# Patient Record
Sex: FEMALE | Race: WHITE | NOT HISPANIC OR LATINO | URBAN - METROPOLITAN AREA
[De-identification: names, ages, dates, MRNs, and addresses within clinical notes are randomized per-mention and may not be internally consistent; named-entity substitution may affect disease eponyms.]

---

## 2017-01-14 ENCOUNTER — INPATIENT (INPATIENT)
Facility: HOSPITAL | Age: 72
LOS: 3 days | Discharge: HOME HEALTH | DRG: 191 | End: 2017-01-18
Attending: INTERNAL MEDICINE | Admitting: INTERNAL MEDICINE
Payer: MEDICARE

## 2017-01-14 VITALS
SYSTOLIC BLOOD PRESSURE: 170 MMHG | HEART RATE: 103 BPM | TEMPERATURE: 101 F | OXYGEN SATURATION: 99 % | RESPIRATION RATE: 21 BRPM | DIASTOLIC BLOOD PRESSURE: 93 MMHG

## 2017-01-14 DIAGNOSIS — D64.9 ANEMIA, UNSPECIFIED: ICD-10-CM

## 2017-01-14 DIAGNOSIS — I25.10 ATHEROSCLEROTIC HEART DISEASE OF NATIVE CORONARY ARTERY WITHOUT ANGINA PECTORIS: ICD-10-CM

## 2017-01-14 DIAGNOSIS — J44.1 CHRONIC OBSTRUCTIVE PULMONARY DISEASE WITH (ACUTE) EXACERBATION: ICD-10-CM

## 2017-01-14 DIAGNOSIS — Z87.19 PERSONAL HISTORY OF OTHER DISEASES OF THE DIGESTIVE SYSTEM: Chronic | ICD-10-CM

## 2017-01-14 DIAGNOSIS — Z29.9 ENCOUNTER FOR PROPHYLACTIC MEASURES, UNSPECIFIED: ICD-10-CM

## 2017-01-14 DIAGNOSIS — N39.0 URINARY TRACT INFECTION, SITE NOT SPECIFIED: ICD-10-CM

## 2017-01-14 DIAGNOSIS — R63.8 OTHER SYMPTOMS AND SIGNS CONCERNING FOOD AND FLUID INTAKE: ICD-10-CM

## 2017-01-14 DIAGNOSIS — M19.90 UNSPECIFIED OSTEOARTHRITIS, UNSPECIFIED SITE: ICD-10-CM

## 2017-01-14 LAB
ANION GAP SERPL CALC-SCNC: 8 MMOL/L — LOW (ref 9–16)
APPEARANCE UR: CLEAR — SIGNIFICANT CHANGE UP
BACTERIA # UR AUTO: PRESENT /HPF
BASOPHILS NFR BLD AUTO: 0.3 % — SIGNIFICANT CHANGE UP (ref 0–2)
BILIRUB UR-MCNC: NEGATIVE — SIGNIFICANT CHANGE UP
BUN SERPL-MCNC: 31 MG/DL — HIGH (ref 7–23)
CALCIUM SERPL-MCNC: 9 MG/DL — SIGNIFICANT CHANGE UP (ref 8.5–10.5)
CHLORIDE SERPL-SCNC: 105 MMOL/L — SIGNIFICANT CHANGE UP (ref 96–108)
CK MB CFR SERPL CALC: 1.2 NG/ML — SIGNIFICANT CHANGE UP (ref 0.5–3.6)
CK SERPL-CCNC: 30 U/L — SIGNIFICANT CHANGE UP (ref 26–192)
CO2 SERPL-SCNC: 25 MMOL/L — SIGNIFICANT CHANGE UP (ref 22–31)
COLOR SPEC: YELLOW — SIGNIFICANT CHANGE UP
CREAT SERPL-MCNC: 1.6 MG/DL — HIGH (ref 0.5–1.3)
DIFF PNL FLD: NEGATIVE — SIGNIFICANT CHANGE UP
EOSINOPHIL NFR BLD AUTO: 1.9 % — SIGNIFICANT CHANGE UP (ref 0–6)
EPI CELLS # UR: SIGNIFICANT CHANGE UP /HPF
GLUCOSE SERPL-MCNC: 115 MG/DL — HIGH (ref 70–99)
GLUCOSE UR QL: NEGATIVE — SIGNIFICANT CHANGE UP
HCT VFR BLD CALC: 24.1 % — LOW (ref 34.5–45)
HGB BLD-MCNC: 7.8 G/DL — LOW (ref 11.5–15.5)
KETONES UR-MCNC: NEGATIVE — SIGNIFICANT CHANGE UP
LACTATE SERPL-SCNC: 0.8 MMOL/L — SIGNIFICANT CHANGE UP (ref 0.5–2)
LEUKOCYTE ESTERASE UR-ACNC: (no result)
LYMPHOCYTES # BLD AUTO: 13.6 % — SIGNIFICANT CHANGE UP (ref 13–44)
MAGNESIUM SERPL-MCNC: 2 MG/DL — SIGNIFICANT CHANGE UP (ref 1.6–2.4)
MCHC RBC-ENTMCNC: 27.7 PG — SIGNIFICANT CHANGE UP (ref 27–34)
MCHC RBC-ENTMCNC: 32.4 G/DL — SIGNIFICANT CHANGE UP (ref 32–36)
MCV RBC AUTO: 85.5 FL — SIGNIFICANT CHANGE UP (ref 80–100)
MONOCYTES NFR BLD AUTO: 5.4 % — SIGNIFICANT CHANGE UP (ref 2–14)
NEUTROPHILS NFR BLD AUTO: 78.8 % — HIGH (ref 43–77)
NITRITE UR-MCNC: NEGATIVE — SIGNIFICANT CHANGE UP
NT-PROBNP SERPL-SCNC: 2426 PG/ML — HIGH
PH UR: 5.5 — SIGNIFICANT CHANGE UP (ref 4–8)
PLATELET # BLD AUTO: 250 K/UL — SIGNIFICANT CHANGE UP (ref 150–400)
POTASSIUM SERPL-MCNC: 4.6 MMOL/L — SIGNIFICANT CHANGE UP (ref 3.5–5.3)
POTASSIUM SERPL-SCNC: 4.6 MMOL/L — SIGNIFICANT CHANGE UP (ref 3.5–5.3)
PROT UR-MCNC: (no result) MG/DL
RBC # BLD: 2.82 M/UL — LOW (ref 3.8–5.2)
RBC # FLD: 13.8 % — SIGNIFICANT CHANGE UP (ref 10.3–16.9)
SODIUM SERPL-SCNC: 138 MMOL/L — SIGNIFICANT CHANGE UP (ref 135–145)
SP GR SPEC: 1.02 — SIGNIFICANT CHANGE UP (ref 1–1.03)
TROPONIN I SERPL-MCNC: <0.015 NG/ML — SIGNIFICANT CHANGE UP (ref 0.01–0.04)
UROBILINOGEN FLD QL: 0.2 E.U./DL — SIGNIFICANT CHANGE UP
WBC # BLD: 9.3 K/UL — SIGNIFICANT CHANGE UP (ref 3.8–10.5)
WBC # FLD AUTO: 9.3 K/UL — SIGNIFICANT CHANGE UP (ref 3.8–10.5)
WBC UR QL: (no result) /HPF

## 2017-01-14 PROCEDURE — 93010 ELECTROCARDIOGRAM REPORT: CPT

## 2017-01-14 PROCEDURE — 99285 EMERGENCY DEPT VISIT HI MDM: CPT | Mod: 25

## 2017-01-14 PROCEDURE — 71010: CPT | Mod: 26

## 2017-01-14 RX ORDER — METOPROLOL TARTRATE 50 MG
25 TABLET ORAL DAILY
Qty: 0 | Refills: 0 | Status: DISCONTINUED | OUTPATIENT
Start: 2017-01-14 | End: 2017-01-18

## 2017-01-14 RX ORDER — IPRATROPIUM/ALBUTEROL SULFATE 18-103MCG
3 AEROSOL WITH ADAPTER (GRAM) INHALATION ONCE
Qty: 0 | Refills: 0 | Status: COMPLETED | OUTPATIENT
Start: 2017-01-14 | End: 2017-01-14

## 2017-01-14 RX ORDER — CIPROFLOXACIN LACTATE 400MG/40ML
250 VIAL (ML) INTRAVENOUS EVERY 24 HOURS
Qty: 0 | Refills: 0 | Status: COMPLETED | OUTPATIENT
Start: 2017-01-14 | End: 2017-01-16

## 2017-01-14 RX ORDER — MAGNESIUM SULFATE 500 MG/ML
2 VIAL (ML) INJECTION ONCE
Qty: 0 | Refills: 0 | Status: COMPLETED | OUTPATIENT
Start: 2017-01-14 | End: 2017-01-14

## 2017-01-14 RX ORDER — OXYCODONE HYDROCHLORIDE 5 MG/1
30 TABLET ORAL
Qty: 0 | Refills: 0 | Status: DISCONTINUED | OUTPATIENT
Start: 2017-01-14 | End: 2017-01-16

## 2017-01-14 RX ORDER — HEPARIN SODIUM 5000 [USP'U]/ML
5000 INJECTION INTRAVENOUS; SUBCUTANEOUS EVERY 8 HOURS
Qty: 0 | Refills: 0 | Status: DISCONTINUED | OUTPATIENT
Start: 2017-01-14 | End: 2017-01-18

## 2017-01-14 RX ORDER — INSULIN LISPRO 100/ML
VIAL (ML) SUBCUTANEOUS
Qty: 0 | Refills: 0 | Status: DISCONTINUED | OUTPATIENT
Start: 2017-01-14 | End: 2017-01-15

## 2017-01-14 RX ORDER — ASPIRIN/CALCIUM CARB/MAGNESIUM 324 MG
81 TABLET ORAL DAILY
Qty: 0 | Refills: 0 | Status: DISCONTINUED | OUTPATIENT
Start: 2017-01-14 | End: 2017-01-18

## 2017-01-14 RX ORDER — IPRATROPIUM/ALBUTEROL SULFATE 18-103MCG
3 AEROSOL WITH ADAPTER (GRAM) INHALATION EVERY 6 HOURS
Qty: 0 | Refills: 0 | Status: DISCONTINUED | OUTPATIENT
Start: 2017-01-14 | End: 2017-01-15

## 2017-01-14 RX ADMIN — Medication 250 MILLIGRAM(S): at 23:04

## 2017-01-14 RX ADMIN — Medication 50 GRAM(S): at 12:55

## 2017-01-14 RX ADMIN — Medication 3 MILLILITER(S): at 11:31

## 2017-01-14 RX ADMIN — OXYCODONE HYDROCHLORIDE 30 MILLIGRAM(S): 5 TABLET ORAL at 23:03

## 2017-01-14 RX ADMIN — OXYCODONE HYDROCHLORIDE 30 MILLIGRAM(S): 5 TABLET ORAL at 20:12

## 2017-01-14 RX ADMIN — Medication 60 MILLIGRAM(S): at 12:54

## 2017-01-14 RX ADMIN — OXYCODONE HYDROCHLORIDE 30 MILLIGRAM(S): 5 TABLET ORAL at 23:26

## 2017-01-14 NOTE — ED PROVIDER NOTE - OBJECTIVE STATEMENT
70 yo F hx of COPD s/p multiple recent admissions for dyspnea/COPD exacerbations presenting with 2 days of progressively worsening dyspnea, generalized activity intolerance, and new productive cough with green sputum.  No assocated fever.  Pt also reports midsternal chest pressure this morning resolved prior to arrival, described as restrictive breathing.  No LE edema or pain.

## 2017-01-14 NOTE — H&P ADULT. - PROBLEM SELECTOR PLAN 5
Patient reports history of heart attacks x 3. reports no cardiac catheterizations or stents.  - Cont ASA

## 2017-01-14 NOTE — H&P ADULT. - PROBLEM SELECTOR PLAN 1
Patient with history of multiple admissions and previous intubations. Patient not on home O2. Currently with dyspnea on exertion and mild wheezing on exam concerning for COPD exacerbation. Vs CHF exacerbation, patient with unknown hx, increased bnp, no congestion on cxr. No consolidation on CXR so low suspicion for PNA. No elevation in trops.   - Duonebs q6h   - Prednisone 40 PO daily   - Echo ordered  - Pulm consult in AM ([place])

## 2017-01-14 NOTE — ED PROVIDER NOTE - MEDICAL DECISION MAKING DETAILS
Pt with s.s noted above likely related to COPD exacerbation.  GOMES noted, given tx still feels sob.  Guaiac neg.  Unlikely symptoms related to hb although noted change from last year.  Plan admit COPD exacerbation tx, anemia gomes and likely needs placement.

## 2017-01-14 NOTE — H&P ADULT. - PROBLEM SELECTOR PLAN 7
DASH diet. Replete lytes prn DASH diet. Replete paramjit prn    Dispo: Admit to [facility] consult- patient reports being admitted to 3 rehab center this year and leaves

## 2017-01-14 NOTE — ED PROVIDER NOTE - RESPIRATORY, MLM
Comfortable breathing at times partial sentences, no acc m use, decreased aeration bl, no abn lung sounds.

## 2017-01-14 NOTE — H&P ADULT. - PROBLEM SELECTOR PLAN 4
Patient w/ history of chronic pain. Patient reports working w/ pain management specialists.   - Cont home med Oxycodone 30mg q3h PRN  - Pain management consult in AM

## 2017-01-14 NOTE — H&P ADULT. - ASSESSMENT
71 y.o F PMH of COPD not on home O2 w/ multiple hospitalizations and previous intubations, presenting to the ED for SOB likely 2/2 COPD exacerbation.

## 2017-01-14 NOTE — ED PROVIDER NOTE - MUSCULOSKELETAL, MLM
No calf ttp or LE edema.  Symmetric UE/LE pulses.  Spine appears normal, range of motion is not limited, no muscle or joint tenderness

## 2017-01-14 NOTE — H&P ADULT. - PROBLEM SELECTOR PLAN 2
Patient with Hb 7.8, decrease from 10.0 in Nov. Patient denies bleeding. Stool Guaiac in ED neg.  - F/u iron studies  - Monitor CBC

## 2017-01-14 NOTE — H&P ADULT. - HISTORY OF PRESENT ILLNESS
71 y.o F PMH of COPD not on home O2 w/ previous intubations and multiple hospital admissions, sciatica w/ chronic pain and wheelchair bound 2/2 LE injury presenting to the ED c.o SOB. Patient reports feeling short of breath this morning when waking up and sitting herself up from bed. Denies any fever, chill n/v/d. Denies any recent illness or sick contacts. Patient was recently discharged from [facility] 2 days ago after a 10 day hospital admission. 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 Patient reports increased urinary frequency, denies dysuria.    HPI limited 2/2 to patient mental status as patient with circumstantial speech with difficulty in redirection.      Vitals in the ED: Tm 100.7 --> 97.8 without intervention -91 BP: 159/84 RR 20 Sat 100% on RA    In the ED patient received: Levaquin 500mg IV, Mg Sulfate 2g IV, Prednisone 60 PO x1, Duoneb x1

## 2017-01-15 DIAGNOSIS — R06.00 DYSPNEA, UNSPECIFIED: ICD-10-CM

## 2017-01-15 DIAGNOSIS — R09.02 HYPOXEMIA: ICD-10-CM

## 2017-01-15 DIAGNOSIS — N17.9 ACUTE KIDNEY FAILURE, UNSPECIFIED: ICD-10-CM

## 2017-01-15 DIAGNOSIS — J44.9 CHRONIC OBSTRUCTIVE PULMONARY DISEASE, UNSPECIFIED: ICD-10-CM

## 2017-01-15 DIAGNOSIS — D64.9 ANEMIA, UNSPECIFIED: ICD-10-CM

## 2017-01-15 DIAGNOSIS — R63.4 ABNORMAL WEIGHT LOSS: ICD-10-CM

## 2017-01-15 DIAGNOSIS — J98.11 ATELECTASIS: ICD-10-CM

## 2017-01-15 DIAGNOSIS — G47.9 SLEEP DISORDER, UNSPECIFIED: ICD-10-CM

## 2017-01-15 DIAGNOSIS — I25.119 ATHEROSCLEROTIC HEART DISEASE OF NATIVE CORONARY ARTERY WITH UNSPECIFIED ANGINA PECTORIS: ICD-10-CM

## 2017-01-15 LAB
ANION GAP SERPL CALC-SCNC: 11 MMOL/L — SIGNIFICANT CHANGE UP (ref 9–16)
BLD GP AB SCN SERPL QL: NEGATIVE — SIGNIFICANT CHANGE UP
BUN SERPL-MCNC: 4 MG/DL — LOW (ref 7–23)
CALCIUM SERPL-MCNC: 7.9 MG/DL — LOW (ref 8.5–10.5)
CHLORIDE SERPL-SCNC: 110 MMOL/L — HIGH (ref 96–108)
CO2 SERPL-SCNC: 20 MMOL/L — LOW (ref 22–31)
CREAT SERPL-MCNC: 0.41 MG/DL — LOW (ref 0.5–1.3)
CULTURE RESULTS: NO GROWTH — SIGNIFICANT CHANGE UP
FERRITIN SERPL-MCNC: 78.7 NG/ML — SIGNIFICANT CHANGE UP (ref 8–252)
GLUCOSE SERPL-MCNC: 83 MG/DL — SIGNIFICANT CHANGE UP (ref 70–99)
HCT VFR BLD CALC: 35.3 % — SIGNIFICANT CHANGE UP (ref 34.5–45)
HGB BLD-MCNC: 11.8 G/DL — SIGNIFICANT CHANGE UP (ref 11.5–15.5)
IRON SATN MFR SERPL: 146 UG/DL — SIGNIFICANT CHANGE UP (ref 50–170)
IRON SATN MFR SERPL: 45 % — HIGH (ref 20–38)
MAGNESIUM SERPL-MCNC: 1.9 MG/DL — SIGNIFICANT CHANGE UP (ref 1.6–2.4)
MCHC RBC-ENTMCNC: 29.7 PG — SIGNIFICANT CHANGE UP (ref 27–34)
MCHC RBC-ENTMCNC: 33.4 G/DL — SIGNIFICANT CHANGE UP (ref 32–36)
MCV RBC AUTO: 88.9 FL — SIGNIFICANT CHANGE UP (ref 80–100)
PLATELET # BLD AUTO: 220 K/UL — SIGNIFICANT CHANGE UP (ref 150–400)
POTASSIUM SERPL-MCNC: 4 MMOL/L — SIGNIFICANT CHANGE UP (ref 3.5–5.3)
POTASSIUM SERPL-SCNC: 4 MMOL/L — SIGNIFICANT CHANGE UP (ref 3.5–5.3)
RBC # BLD: 3.97 M/UL — SIGNIFICANT CHANGE UP (ref 3.8–5.2)
RBC # BLD: 3.97 M/UL — SIGNIFICANT CHANGE UP (ref 3.8–5.2)
RBC # FLD: 12.5 % — SIGNIFICANT CHANGE UP (ref 10.3–16.9)
RETICS/RBC NFR: 1.6 % — SIGNIFICANT CHANGE UP (ref 0.5–2.5)
RH IG SCN BLD-IMP: POSITIVE — SIGNIFICANT CHANGE UP
SODIUM SERPL-SCNC: 141 MMOL/L — SIGNIFICANT CHANGE UP (ref 135–145)
SPECIMEN SOURCE: SIGNIFICANT CHANGE UP
TIBC SERPL-MCNC: 326 UG/DL — SIGNIFICANT CHANGE UP (ref 250–450)
TRANSFERRIN SERPL-MCNC: 222 MG/DL — SIGNIFICANT CHANGE UP (ref 200–360)
WBC # BLD: 5 K/UL — SIGNIFICANT CHANGE UP (ref 3.8–10.5)
WBC # FLD AUTO: 5 K/UL — SIGNIFICANT CHANGE UP (ref 3.8–10.5)

## 2017-01-15 RX ORDER — ACETAMINOPHEN 500 MG
650 TABLET ORAL EVERY 6 HOURS
Qty: 0 | Refills: 0 | Status: DISCONTINUED | OUTPATIENT
Start: 2017-01-15 | End: 2017-01-18

## 2017-01-15 RX ORDER — ALBUTEROL 90 UG/1
2.5 AEROSOL, METERED ORAL
Qty: 0 | Refills: 0 | Status: DISCONTINUED | OUTPATIENT
Start: 2017-01-15 | End: 2017-01-16

## 2017-01-15 RX ORDER — IPRATROPIUM BROMIDE 0.2 MG/ML
1 SOLUTION, NON-ORAL INHALATION EVERY 6 HOURS
Qty: 0 | Refills: 0 | Status: DISCONTINUED | OUTPATIENT
Start: 2017-01-15 | End: 2017-01-16

## 2017-01-15 RX ORDER — INSULIN LISPRO 100/ML
VIAL (ML) SUBCUTANEOUS
Qty: 0 | Refills: 0 | Status: DISCONTINUED | OUTPATIENT
Start: 2017-01-15 | End: 2017-01-18

## 2017-01-15 RX ADMIN — Medication 1 TABLET(S): at 12:42

## 2017-01-15 RX ADMIN — Medication 25 MILLIGRAM(S): at 06:18

## 2017-01-15 RX ADMIN — Medication 1 PUFF(S): at 07:44

## 2017-01-15 RX ADMIN — OXYCODONE HYDROCHLORIDE 30 MILLIGRAM(S): 5 TABLET ORAL at 06:19

## 2017-01-15 RX ADMIN — OXYCODONE HYDROCHLORIDE 30 MILLIGRAM(S): 5 TABLET ORAL at 04:00

## 2017-01-15 RX ADMIN — Medication 81 MILLIGRAM(S): at 12:43

## 2017-01-15 RX ADMIN — Medication 1 TABLET(S): at 14:30

## 2017-01-15 RX ADMIN — OXYCODONE HYDROCHLORIDE 30 MILLIGRAM(S): 5 TABLET ORAL at 03:07

## 2017-01-15 RX ADMIN — Medication 650 MILLIGRAM(S): at 06:57

## 2017-01-15 RX ADMIN — OXYCODONE HYDROCHLORIDE 30 MILLIGRAM(S): 5 TABLET ORAL at 18:30

## 2017-01-15 RX ADMIN — OXYCODONE HYDROCHLORIDE 30 MILLIGRAM(S): 5 TABLET ORAL at 07:05

## 2017-01-15 RX ADMIN — Medication 1 TABLET(S): at 02:11

## 2017-01-15 RX ADMIN — Medication 650 MILLIGRAM(S): at 20:00

## 2017-01-15 RX ADMIN — OXYCODONE HYDROCHLORIDE 30 MILLIGRAM(S): 5 TABLET ORAL at 13:00

## 2017-01-15 RX ADMIN — Medication 650 MILLIGRAM(S): at 19:32

## 2017-01-15 RX ADMIN — OXYCODONE HYDROCHLORIDE 30 MILLIGRAM(S): 5 TABLET ORAL at 17:53

## 2017-01-15 RX ADMIN — OXYCODONE HYDROCHLORIDE 30 MILLIGRAM(S): 5 TABLET ORAL at 12:41

## 2017-01-15 RX ADMIN — Medication 40 MILLIGRAM(S): at 12:42

## 2017-01-15 RX ADMIN — Medication 1 PUFF(S): at 17:53

## 2017-01-15 RX ADMIN — Medication 650 MILLIGRAM(S): at 07:05

## 2017-01-15 RX ADMIN — Medication 1 TABLET(S): at 13:52

## 2017-01-15 RX ADMIN — Medication 1 TABLET(S): at 02:15

## 2017-01-15 NOTE — DIETITIAN INITIAL EVALUATION ADULT. - PROBLEM SELECTOR PLAN 7
DASH diet. Replete paramjit prn    Dispo: Admit to [facility] consult- patient reports being admitted to 3 rehab center this year and leaves

## 2017-01-15 NOTE — PROGRESS NOTE ADULT - ASSESSMENT
71F PMH COPD (not on home O2) c/b multiple hospitalizations for exacerbations/intubations, chronic pain, MI, admitted 1/14 for SOB x 1 day likely 2/2 COPD exacerbation.

## 2017-01-15 NOTE — DIETITIAN INITIAL EVALUATION ADULT. - PHYSICAL APPEARANCE
RD unable to determine pt physical appearance as pt was entirely covered with blankets however RN reports pt is very thin.

## 2017-01-15 NOTE — DIETITIAN INITIAL EVALUATION ADULT. - ENERGY NEEDS
Height: 5 feet 3 inches, Weight (Current): 82 pounds,  pounds +/-10%, %IBW 71%, BMI 14.5    IBW used to calculate energy needs due to pt's current body weight is less than % of IBW   EER based on low BMI.

## 2017-01-15 NOTE — PROGRESS NOTE ADULT - PROBLEM SELECTOR PLAN 6
Hb 7.8 on admission, decreased from 10.0 in Nov. Patient denies bleeding. Stool Guaiac in ED neg. Hgb normalized to 11.8 today.

## 2017-01-15 NOTE — DIETITIAN INITIAL EVALUATION ADULT. - PROBLEM SELECTOR PLAN 3
Patient reports increased urinary frequency. UA with few wbc, present bacteria.   -  Cipro 250 q12 for 3 days

## 2017-01-15 NOTE — PROGRESS NOTE ADULT - PROBLEM SELECTOR PLAN 4
OLEGARIO on admission with Cr elevated 1.6, perhaps 2/2 UTI. Cr now 0.41 since starting abx.  - Trend

## 2017-01-15 NOTE — PROGRESS NOTE ADULT - PROBLEM SELECTOR PLAN 2
Patient reports increased urinary frequency. UA with few wbc, present bacteria. UCX NG, BCX NGTD x2  - c/w Cipro 250 q12 for 3 days (day 2/3)  - f/u final BCX

## 2017-01-15 NOTE — PROGRESS NOTE ADULT - PROBLEM SELECTOR PLAN 1
Patient with history of COPD (not on home O2) c/b multiple admissions for exacerbations/intubations. Admitted for SOB x 1 day likely 2/2 COPD exacerbation vs. less likely CHF exacerbation (given elevated BNP on admission, however elevated BNP could be related to COPD and OLEGARIO on admission). Unclear trigger for exacerbation - AF, no leukocytosis, no infiltrate on CXR  - c/w Prednisone 40mg PO QD, ISS given steroids  - c/w Atrovent 1 puff q6h (pt refuses Duoneb)  - Dr. Keller consulted today, f/u recs  - f/u TTE Patient with history of COPD (not on home O2) c/b multiple admissions for exacerbations/intubations. Admitted for SOB x 1 day likely 2/2 COPD exacerbation vs. less likely CHF exacerbation (given elevated BNP on admission, however elevated BNP could be related to COPD and OLEGARIO on admission). Unclear trigger for exacerbation - AF, no leukocytosis, no infiltrate on CXR  - c/w Prednisone 40mg PO QD  - c/w Atrovent 1 puff q6h (pt refuses Duoneb)  - Dr. Keller consulted today, f/u recs  - f/u TTE Patient with history of COPD (not on home O2) c/b multiple admissions for exacerbations/intubations. Admitted for SOB x 1 day likely 2/2 COPD exacerbation vs. less likely CHF exacerbation (given elevated BNP on admission, however elevated BNP could be related to COPD and OLEGARIO on admission). Unclear trigger for exacerbation - AF, no leukocytosis, no infiltrate on CXR  - c/w Prednisone 40mg PO QD  - c/w ISS given steroids  - c/w Atrovent 1 puff q6h (pt refuses Duoneb)  - Dr. Keller consulted today, f/u recs  - f/u TTE

## 2017-01-15 NOTE — PROGRESS NOTE ADULT - SUBJECTIVE AND OBJECTIVE BOX
Overnight Events: Pt complained of migraine, night team started Tylenol and Fioricet. Pt also complained of L axillary/chest pain (which pt has a chronic history of), likely MSK as reproducible on exam and repeat EKG unchanged from admission EKG.    [OBJECTIVE]:    Vital Signs:  T(F): , Max: 100.7 ( @ 10:45)  HR:  (90 - 105)  BP:  (132/77 - 173/96)  BP(mean): --  RR:  (16 - 21)  SpO2:  (95% - 100%)  Wt(kg): --  CVP(cm H2O): --      I & Os for current day (as of 01-15 @ 10:31)  =============================================  IN: 0 ml / OUT: 500 ml / NET: -500 ml    CAPILLARY BLOOD GLUCOSE      Physical Exam:  T(F): 96.8  HR: 90  BP: 132/77  RR: 16  SpO2: 100%  Wt(kg): --    General: NAD, circumstantial, unredirectable  HEENT: no JVD  Respiratory: diminished breath sounds b/l, scattered wheezes  Cardiovascular: RRR, nrml s1/s2, no MRG  Abdomen: Soft, NTND, normoactive BS  Extremities: no edema    Medications:  MEDICATIONS  (STANDING):  aspirin enteric coated 81milliGRAM(s) Oral daily  metoprolol succinate ER 25milliGRAM(s) Oral daily  insulin lispro (HumaLOG) corrective regimen sliding scale  SubCutaneous Before meals and at bedtime  heparin  Injectable 5000Unit(s) SubCutaneous every 8 hours  ciprofloxacin     Tablet 250milliGRAM(s) Oral every 24 hours  ipratropium 17 MICROgram(s) HFA Inhaler 1Puff(s) Inhalation every 6 hours  predniSONE   Tablet 40milliGRAM(s) Oral daily    MEDICATIONS  (PRN):  oxyCODONE IR 30milliGRAM(s) Oral every 3 hours PRN Severe Pain (7 - 10)  acetaminophen/butalbital/caffeine 1Tablet(s) Oral two times a day PRN headache  acetaminophen   Tablet. 650milliGRAM(s) Oral every 6 hours PRN Moderate Pain (4 - 6)      Allergies:  Allergies    apple (Unknown)  penicillin (Unknown)    Intolerances        Labs:                        11.8   5.0   )-----------( 220      ( 15 Natan 2017 08:37 )             35.3     15 Natan 2017 08:37    141    |  110    |  4      ----------------------------<  83     4.0     |  20     |  0.41     Ca    7.9        15 Natan 2017 08:37  Mg     1.9       15 Natan 2017 08:37        Urinalysis Basic - ( 2017 16:03 )    Color: Yellow / Appearance: Clear / S.020 / pH: x  Gluc: x / Ketone: NEGATIVE  / Bili: NEGATIVE / Urobili: 0.2 E.U./dL   Blood: x / Protein: Trace mg/dL / Nitrite: NEGATIVE   Leuk Esterase: Small / RBC: x / WBC Many /HPF   Sq Epi: x / Non Sq Epi: Few /HPF / Bacteria: Present /HPF

## 2017-01-15 NOTE — PROGRESS NOTE ADULT - PROBLEM SELECTOR PLAN 5
f/u. She believes that it is from [facility] f/u. She believes that it is from [facility]. But increase is very unusual - artefact?

## 2017-01-15 NOTE — PROGRESS NOTE ADULT - ASSESSMENT
elderly female with multiple somatic complaints  COPD  contracted, cachexic, kyphotic    Pulmonary consult-Dr. Neves  Cardiology  social service  supportive care

## 2017-01-15 NOTE — PROGRESS NOTE ADULT - PROBLEM SELECTOR PLAN 3
Patient w/ history of chronic pain. Patient reports working w/ pain management specialists.   - c/w home med Oxycodone 30mg q3h PRN severe pain  - c/w Tylenol 650mg q6h PRN moderate pain and Fioricet BID PRN HA x 2 days (total daily Tylenol dose of 3.25g --> NO ADDITIONAL TYLENOL)  - Pain management consult on Monday since [facility] office closed over wknd ([phone])

## 2017-01-15 NOTE — PROGRESS NOTE ADULT - SUBJECTIVE AND OBJECTIVE BOX
PUD [facility] ATTENDING    71 year old  women was admitted for dyspnea, cough, weight loss, chest pain, chest pressure and tightness.    PAST MEDICAL & SURGICAL HISTORY:  CAD (coronary artery disease)  Arthritis  COPD (chronic obstructive pulmonary disease)  H/O appendicitis  FMF    FAMILY HISTORY:  fa 70s  mo 85? colon cancer    SOCIAL HISTORY:  lives at home. Had a visiting RN.   retired     REVIEW OF SYSTEMS:  Constitutional: +weight change, +weight loss, -fever, -chills, +fatigue, -night sweats  Eyes: +cataracts -discharge, -eye pain, -visual change, -discharge  ENT:  -hearing difficulty, -tinnitus, -vertigo, -sinus pain, -throat pain, -epistaxis, -dysphagia, -hoarseness  Neck: -pain, -stiffness, -neck swelling  Respiratory: -cough (denied), +wheezing, -hemoptysis      Cardiovascular: +left chest pain, -dysrhythmia, +palpitations, +dizziness   Gastrointestinal: + left upper abdominal pain, -nausea, -vomiting, -hematemesis, -diarrhea, -constipation. -melena  Genitourinary: +dysuria with urine "pressure", -frequency, -hematuria, -incontinence      Neurologic: ++headache, +migraines, -memory loss, +loss of strength, -numbness, -tremor     Skin: -itching, -burning, -rash, -lesions   Lymphatic: -enlarged lymph nodes  Endocrine: -hair loss, -temperature intolerance         Musculoskeletal: +back pain (sciatic, scoliosis), -joint pain, -extremity pain  Psychiatric: -visual change, -auditory change, -depression, -anxiety, -suicidal  Sleep: +disorder ("I do not sleep well"), -insomnia, -sleep deprivation  Heme/Lymph: -easy bruising, -bleeding gums            Allergy and Immunologic: -hives, -eczema    Vital Signs Last 24 Hrs  T(C): 36, Max: 36.8 ( @ 22:58)  T(F): 96.8, Max: 98.2 ( @ 22:58)  HR: 90 (90 - 105)  BP: 132/77 (132/77 - 173/96)  BP(mean): --  RR: 16 (16 - 20)  SpO2: 100% (95% - 100%)    I&O's Detail    PHYSICAL EXAM:  +CP +SOB  In bed, cachectic (about 30 pounds loss?)  Muscle atrophy, well developed, comfortable, - acute distress; vital signs are monitored  Eyes: PERRLA, EOMI, -conjunctivitis, -scleritis   Head: no focal deficit, normocephalic,  no trauma  ENMT: moist tongue, no thrush, -nasal discharge, -hoarseness, normal hearing, -cough, -hemoptysis, trachea midline  Neck: supple, - lymphadenopathy,  -masses, -JVD  Respiratory: moderately to severely bilateral diminished breath sounds, -wheezing, -rhonchi, -rales, -crackles  Chest: -accessory muscle use, -paradoxical breathing  Cardiovascular: regular rate and sinus rhythm, S1 S2 normal, -S3, -S4, -murmur, -gallop, -rub  Gastrointestinal: soft, nontender, nondistended, normal bowel sounds, no hepatosplenomegaly  Genitourinary: -flank pain, +dysuria  Extremities: +clubbing, -cyanosis, -edema    Vascular: peripheral pulses palpable 2+ bilaterally  Neurological: alert, oriented x 3, no focal deficit, -tremor   Skin: warm, dry, -erythema, iv site intact  Lymph nodes; no cervical, supraclavicular or axillary adenopathy  Psychiatric: cooperative, appropriate mood    MEDICATIONS  (STANDING):  aspirin enteric coated 81milliGRAM(s) Oral daily  metoprolol succinate ER 25milliGRAM(s) Oral daily  heparin  Injectable 5000Unit(s) SubCutaneous every 8 hours  ciprofloxacin     Tablet 250milliGRAM(s) Oral every 24 hours  ipratropium 17 MICROgram(s) HFA Inhaler 1Puff(s) Inhalation every 6 hours  predniSONE   Tablet 40milliGRAM(s) Oral daily  multivitamin 1Tablet(s) Oral daily  ALBUTerol   0.5% 2.5milliGRAM(s) Nebulizer four times a day    MEDICATIONS  (PRN):  oxyCODONE IR 30milliGRAM(s) Oral every 3 hours PRN Severe Pain (7 - 10)  acetaminophen/butalbital/caffeine 1Tablet(s) Oral two times a day PRN headache  acetaminophen   Tablet. 650milliGRAM(s) Oral every 6 hours PRN Moderate Pain (4 - 6)    Allergies  apple (Unknown)  penicillin (Unknown)  Intolerances    LABS:                        11.8   5.0   )-----------( 220      ( 15 Natan 2017 08:37 )             35.3     15 Natan 2017 08:37    141    |  110    |  4      ----------------------------<  83     4.0     |  20     |  0.41     Ca    7.9        15 Natan 2017 08:37  Mg     1.9       15 Natan 2017 08:37    Urinalysis Basic - ( 2017 16:03 )  Color: Yellow / Appearance: Clear / S.020 / pH: x  Gluc: x / Ketone: NEGATIVE  / Bili: NEGATIVE / Urobili: 0.2 E.U./dL   Blood: x / Protein: Trace mg/dL / Nitrite: NEGATIVE   Leuk Esterase: Small / RBC: x / WBC Many /HPF   Sq Epi: x / Non Sq Epi: Few /HPF / Bacteria: Present /HPF    +DVT prophylaxis  -Sleep  +Nutrition goals, oral  -Pain  -Decubital ulcer  -GI prophylaxis (PPV, coagulopathy, Hx)  +Aspiration prophylaxis (45 degrees)    +Sedation/analgesia stopped  +ID (phos, CH, mupi, SB)  -Delirium    +Cardiac ASA/statin  +Prevention  vaccinations  +Education  quit smoking 30 years ago  +Medication reviewed (drug-drug interactions, PDA)  Medical devices    Discussed with PGY, RN    RADIOLOGY & ADDITIONAL STUDIES:    CXR reviewed (pa,lat): hyperinflation, possible atelectasis PUD [facility] [place]    71 year old  women was admitted for dyspnea, cough, weight loss, chest pain, chest pressure and tightness.    PAST MEDICAL & SURGICAL HISTORY:  CAD (coronary artery disease)  Arthritis  COPD (chronic obstructive pulmonary disease)  H/O appendicitis  FMF    FAMILY HISTORY:  fa 70s  mo 85? colon cancer    SOCIAL HISTORY:  lives at home. Had a visiting RN. Used to smoke  retired     REVIEW OF SYSTEMS:  Constitutional: +weight change, +weight loss, -fever, -chills, +fatigue, -night sweats  Eyes: +cataracts -discharge, -eye pain, -visual change, -discharge  ENT:  -hearing difficulty, -tinnitus, -vertigo, -sinus pain, -throat pain, -epistaxis, -dysphagia, -hoarseness  Neck: -pain, -stiffness, -neck swelling  Respiratory: -cough (denied), +wheezing, -hemoptysis      Cardiovascular: +left chest pain, -dysrhythmia, +palpitations, +dizziness   Gastrointestinal: + left upper abdominal pain, -nausea, -vomiting, -hematemesis, -diarrhea, -constipation. -melena  Genitourinary: +dysuria with urine "pressure", -frequency, -hematuria, -incontinence      Neurologic: ++headache, +migraines, -memory loss, +loss of strength, -numbness, -tremor     Skin: -itching, -burning, -rash, -lesions   Lymphatic: -enlarged lymph nodes  Endocrine: -hair loss, -temperature intolerance         Musculoskeletal: +back pain (sciatic, scoliosis), -joint pain, -extremity pain  Psychiatric: -visual change, -auditory change, -depression, -anxiety, -suicidal  Sleep: +disorder ("I do not sleep well"), -insomnia, -sleep deprivation  Heme/Lymph: -easy bruising, -bleeding gums            Allergy and Immunologic: -hives, -eczema    Vital Signs Last 24 Hrs  T(C): 36, Max: 36.8 ( @ 22:58)  T(F): 96.8, Max: 98.2 ( @ 22:58)  HR: 90 (90 - 105)  BP: 132/77 (132/77 - 173/96)  BP(mean): --  RR: 16 (16 - 20)  SpO2: 100% (95% - 100%)    I&O's Detail    PHYSICAL EXAM:  +CP +SOB  In bed, cachectic (about 30 pounds loss?)  Muscle atrophy, well developed, comfortable, - acute distress; vital signs are monitored  Eyes: PERRLA, EOMI, -conjunctivitis, -scleritis   Head: no focal deficit, normocephalic,  no trauma  ENMT: moist tongue, no thrush, -nasal discharge, -hoarseness, normal hearing, -cough, -hemoptysis, trachea midline  Neck: supple, - lymphadenopathy,  -masses, -JVD  Respiratory: moderately to severely bilateral diminished breath sounds, -wheezing, -rhonchi, -rales, -crackles  Chest: -accessory muscle use, -paradoxical breathing  Cardiovascular: regular rate and sinus rhythm, S1 S2 normal, -S3, -S4, -murmur, -gallop, -rub  Gastrointestinal: soft, nontender, nondistended, normal bowel sounds, no hepatosplenomegaly  Genitourinary: -flank pain, +dysuria  Extremities: +clubbing, -cyanosis, -edema    Vascular: peripheral pulses palpable 2+ bilaterally  Neurological: alert, oriented x 3, no focal deficit, -tremor   Skin: warm, dry, -erythema, iv site intact  Lymph nodes; no cervical, supraclavicular or axillary adenopathy  Psychiatric: cooperative, appropriate mood    MEDICATIONS  (STANDING):  aspirin enteric coated 81milliGRAM(s) Oral daily  metoprolol succinate ER 25milliGRAM(s) Oral daily  heparin  Injectable 5000Unit(s) SubCutaneous every 8 hours  ciprofloxacin     Tablet 250milliGRAM(s) Oral every 24 hours  ipratropium 17 MICROgram(s) HFA Inhaler 1Puff(s) Inhalation every 6 hours  predniSONE   Tablet 40milliGRAM(s) Oral daily  multivitamin 1Tablet(s) Oral daily  ALBUTerol   0.5% 2.5milliGRAM(s) Nebulizer four times a day    MEDICATIONS  (PRN):  oxyCODONE IR 30milliGRAM(s) Oral every 3 hours PRN Severe Pain (7 - 10)  acetaminophen/butalbital/caffeine 1Tablet(s) Oral two times a day PRN headache  acetaminophen   Tablet. 650milliGRAM(s) Oral every 6 hours PRN Moderate Pain (4 - 6)    Allergies  apple (Unknown)  penicillin (Unknown)  Intolerances    LABS:                        11.8   5.0   )-----------( 220      ( 15 Natan 2017 08:37 )             35.3     15 Natan 2017 08:37    141    |  110    |  4      ----------------------------<  83     4.0     |  20     |  0.41     Ca    7.9        15 Natan 2017 08:37  Mg     1.9       15 Natan 2017 08:37    Urinalysis Basic - ( 2017 16:03 )  Color: Yellow / Appearance: Clear / S.020 / pH: x  Gluc: x / Ketone: NEGATIVE  / Bili: NEGATIVE / Urobili: 0.2 E.U./dL   Blood: x / Protein: Trace mg/dL / Nitrite: NEGATIVE   Leuk Esterase: Small / RBC: x / WBC Many /HPF   Sq Epi: x / Non Sq Epi: Few /HPF / Bacteria: Present /HPF    +DVT prophylaxis  -Sleep  +Nutrition goals, oral  -Pain  -Decubital ulcer  -GI prophylaxis (PPV, coagulopathy, Hx)  +Aspiration prophylaxis (45 degrees)    +Sedation/analgesia stopped  +ID (phos, CH, mupi, SB)  -Delirium    +Cardiac ASA/statin  +Prevention  vaccinations with influenza/pneumovax  +Education  quit smoking 30 years ago  +Medication reviewed (drug-drug interactions, PDA)  Medical devices    Discussed with PGY, RN    RADIOLOGY & ADDITIONAL STUDIES:    CXR reviewed (pa,lat): hyperinflation, possible atelectasis

## 2017-01-15 NOTE — DIETITIAN INITIAL EVALUATION ADULT. - OTHER INFO
pt with hx of CAD is now admitted for SOB 2/2 COPD exacerbation. pt is currently on DASH diet. Per RN pt had reported consuming 100% of breakfast today however NA reports pt did not consume much of the meal. no reported issues chewing/swallowing or GI distress reported @ this time per RN. Skin: no edema/pressure ulcers noted at this time. pt noted with apple allergy in EMR. High Nutrition Risk.

## 2017-01-15 NOTE — CHART NOTE - NSCHARTNOTEFT_GEN_A_CORE
Upon Nutritional Assessment by the Registered Dietitian your patient was determined to meet criteria / has evidence of the following diagnosis/diagnoses:          [ ]  Mild Protein Calorie Malnutrition        [ ]  Moderate Protein Calorie Malnutrition        [ ] Severe Protein Calorie Malnutrition        [ ] Unspecified Protein Calorie Malnutrition        [ x ] Underweight / BMI <19        [ ] Morbid Obesity / BMI > 40      Findings as based on:  •  Comprehensive nutrition assessment and consultation  BMI 14.5     Treatment:  see IA  The following diet has been recommended: see IA      PROVIDER Section:     By signing this assessment you are acknowledging and agree with the diagnosis/diagnoses assigned by the Registered Dietitian    Comments:

## 2017-01-15 NOTE — DIETITIAN INITIAL EVALUATION ADULT. - SOURCE
RN- pt is sleeping in bed and RN requested for pt to not be woken up. no prior RD notes or family @ bedside.

## 2017-01-15 NOTE — PROGRESS NOTE ADULT - PROBLEM SELECTOR PLAN 7
HSQ. ISS given steroids - HSQ  - ISS ordered on admission in setting of steroids, however pt refusing FS and glucose on AM BMP WNL, so will dc FS and monitor glucose via BMP - HSQ

## 2017-01-15 NOTE — PROGRESS NOTE ADULT - SUBJECTIVE AND OBJECTIVE BOX
71 year old female who went to ER c/o SOB, SWEET and failure to thrive. Frequent visits to various emergency rooms. Sees pain management physician for complaints of chronic pain. Initial CBC in ER showed low HCT but repeat was normal. Bacteriuria on urinalysis.    PAST MEDICAL & SURGICAL HISTORY:  CAD (coronary artery disease)  Arthritis  COPD (chronic obstructive pulmonary disease)  H/O appendicitis  F PMH COPD (not on home O2) c/b multiple hospitalizations for exacerbations/intubations, chronic pain, MI, admitted 1/14 for SOB x 1 day likely 2/2 COPD exacerbation.    Allergies:  penicillin (Unknown)    MEDICATIONS  (STANDING):  aspirin enteric coated 81milliGRAM(s) Oral daily  metoprolol succinate ER 25milliGRAM(s) Oral daily  heparin  Injectable 5000Unit(s) SubCutaneous every 8 hours  ciprofloxacin     Tablet 250milliGRAM(s) Oral every 24 hours  ipratropium 17 MICROgram(s) HFA Inhaler 1Puff(s) Inhalation every 6 hours  predniSONE   Tablet 40milliGRAM(s) Oral daily  multivitamin 1Tablet(s) Oral daily    MEDICATIONS  (PRN):  oxyCODONE IR 30milliGRAM(s) Oral every 3 hours PRN Severe Pain (7 - 10)  acetaminophen/butalbital/caffeine 1Tablet(s) Oral two times a day PRN headache  acetaminophen   Tablet. 650milliGRAM(s) Oral every 6 hours PRN Moderate Pain (4 - 6)    Vital Signs Last 24 Hrs  T(C): 36, Max: 36.8 (01-14 @ 22:58)  T(F): 96.8, Max: 98.2 (01-14 @ 22:58)  HR: 90 (90 - 105)  BP: 132/77 (132/77 - 173/96)  BP(mean): --  RR: 16 (16 - 20)  SpO2: 100% (95% - 100%)  General: kyphotic, cachectic elder female, poor historian, tangential  HEENT: no JVD  Respiratory: diminished breath sounds b/l, scattered wheezes  Cardiovascular: S1, S2, no rubs or murmurs  Abdomen: Soft, normoactive BS, no masses or tenderness.  Extremities: contracted, no edema                          11.8   5.0   )-----------( 220      ( 15 Natan 2017 08:37 )             35.3     15 Natan 2017 08:37    141    |  110    |  4      ----------------------------<  83     4.0     |  20     |  0.41     Ca    7.9        15 Natan 2017 08:37  Mg     1.9       15 Natan 2017 08:37

## 2017-01-15 NOTE — PROGRESS NOTE ADULT - ASSESSMENT
obtain CT chest without contrast obtain CT chest without contrast (clubbing, weight loss, ?atelectasis)

## 2017-01-15 NOTE — PROGRESS NOTE ADULT - PROBLEM SELECTOR PLAN 5
Patient reports history of heart attacks x 3. reports no cardiac catheterizations or stents. EKG on admission with Q waves in V1-V2 possibly c/w septal MI  - Cont ASA and metoprolol, consider starting statin

## 2017-01-16 LAB
ANION GAP SERPL CALC-SCNC: 8 MMOL/L — LOW (ref 9–16)
BASOPHILS NFR BLD AUTO: 0.4 % — SIGNIFICANT CHANGE UP (ref 0–2)
BLD GP AB SCN SERPL QL: NEGATIVE — SIGNIFICANT CHANGE UP
BUN SERPL-MCNC: 28 MG/DL — HIGH (ref 7–23)
CALCIUM SERPL-MCNC: 8.6 MG/DL — SIGNIFICANT CHANGE UP (ref 8.5–10.5)
CHLORIDE SERPL-SCNC: 106 MMOL/L — SIGNIFICANT CHANGE UP (ref 96–108)
CO2 SERPL-SCNC: 26 MMOL/L — SIGNIFICANT CHANGE UP (ref 22–31)
CREAT SERPL-MCNC: 1.17 MG/DL — SIGNIFICANT CHANGE UP (ref 0.5–1.3)
EOSINOPHIL NFR BLD AUTO: 0.7 % — SIGNIFICANT CHANGE UP (ref 0–6)
GLUCOSE SERPL-MCNC: 84 MG/DL — SIGNIFICANT CHANGE UP (ref 70–99)
HCT VFR BLD CALC: 24.8 % — LOW (ref 34.5–45)
HGB BLD-MCNC: 7.8 G/DL — LOW (ref 11.5–15.5)
LYMPHOCYTES # BLD AUTO: 33.1 % — SIGNIFICANT CHANGE UP (ref 13–44)
MAGNESIUM SERPL-MCNC: 2.1 MG/DL — SIGNIFICANT CHANGE UP (ref 1.6–2.4)
MCHC RBC-ENTMCNC: 27.4 PG — SIGNIFICANT CHANGE UP (ref 27–34)
MCHC RBC-ENTMCNC: 31.5 G/DL — LOW (ref 32–36)
MCV RBC AUTO: 87 FL — SIGNIFICANT CHANGE UP (ref 80–100)
MONOCYTES NFR BLD AUTO: 7.5 % — SIGNIFICANT CHANGE UP (ref 2–14)
NEUTROPHILS NFR BLD AUTO: 58.3 % — SIGNIFICANT CHANGE UP (ref 43–77)
PLATELET # BLD AUTO: 248 K/UL — SIGNIFICANT CHANGE UP (ref 150–400)
POTASSIUM SERPL-MCNC: 4.8 MMOL/L — SIGNIFICANT CHANGE UP (ref 3.5–5.3)
POTASSIUM SERPL-SCNC: 4.8 MMOL/L — SIGNIFICANT CHANGE UP (ref 3.5–5.3)
RBC # BLD: 2.85 M/UL — LOW (ref 3.8–5.2)
RBC # FLD: 13.9 % — SIGNIFICANT CHANGE UP (ref 10.3–16.9)
RH IG SCN BLD-IMP: POSITIVE — SIGNIFICANT CHANGE UP
SODIUM SERPL-SCNC: 140 MMOL/L — SIGNIFICANT CHANGE UP (ref 135–145)
WBC # BLD: 5.6 K/UL — SIGNIFICANT CHANGE UP (ref 3.8–10.5)
WBC # FLD AUTO: 5.6 K/UL — SIGNIFICANT CHANGE UP (ref 3.8–10.5)

## 2017-01-16 RX ORDER — TIOTROPIUM BROMIDE 18 UG/1
1 CAPSULE ORAL; RESPIRATORY (INHALATION) EVERY 4 HOURS
Qty: 0 | Refills: 0 | Status: DISCONTINUED | OUTPATIENT
Start: 2017-01-16 | End: 2017-01-16

## 2017-01-16 RX ORDER — OXYCODONE HYDROCHLORIDE 5 MG/1
15 TABLET ORAL EVERY 6 HOURS
Qty: 0 | Refills: 0 | Status: DISCONTINUED | OUTPATIENT
Start: 2017-01-16 | End: 2017-01-17

## 2017-01-16 RX ORDER — POLYETHYLENE GLYCOL 3350 17 G/17G
17 POWDER, FOR SOLUTION ORAL DAILY
Qty: 0 | Refills: 0 | Status: DISCONTINUED | OUTPATIENT
Start: 2017-01-16 | End: 2017-01-17

## 2017-01-16 RX ORDER — SENNA PLUS 8.6 MG/1
2 TABLET ORAL AT BEDTIME
Qty: 0 | Refills: 0 | Status: DISCONTINUED | OUTPATIENT
Start: 2017-01-16 | End: 2017-01-17

## 2017-01-16 RX ORDER — IPRATROPIUM/ALBUTEROL SULFATE 18-103MCG
3 AEROSOL WITH ADAPTER (GRAM) INHALATION EVERY 4 HOURS
Qty: 0 | Refills: 0 | Status: DISCONTINUED | OUTPATIENT
Start: 2017-01-16 | End: 2017-01-16

## 2017-01-16 RX ORDER — DOCUSATE SODIUM 100 MG
100 CAPSULE ORAL DAILY
Qty: 0 | Refills: 0 | Status: DISCONTINUED | OUTPATIENT
Start: 2017-01-16 | End: 2017-01-17

## 2017-01-16 RX ORDER — IPRATROPIUM BROMIDE 0.2 MG/ML
1 SOLUTION, NON-ORAL INHALATION EVERY 4 HOURS
Qty: 0 | Refills: 0 | Status: DISCONTINUED | OUTPATIENT
Start: 2017-01-16 | End: 2017-01-17

## 2017-01-16 RX ADMIN — OXYCODONE HYDROCHLORIDE 30 MILLIGRAM(S): 5 TABLET ORAL at 04:31

## 2017-01-16 RX ADMIN — Medication 40 MILLIGRAM(S): at 07:27

## 2017-01-16 RX ADMIN — Medication 81 MILLIGRAM(S): at 15:55

## 2017-01-16 RX ADMIN — Medication 1 TABLET(S): at 00:56

## 2017-01-16 RX ADMIN — OXYCODONE HYDROCHLORIDE 15 MILLIGRAM(S): 5 TABLET ORAL at 10:00

## 2017-01-16 RX ADMIN — Medication 1 TABLET(S): at 15:55

## 2017-01-16 RX ADMIN — Medication 1 TABLET(S): at 09:09

## 2017-01-16 RX ADMIN — Medication 1 PUFF(S): at 07:32

## 2017-01-16 RX ADMIN — OXYCODONE HYDROCHLORIDE 30 MILLIGRAM(S): 5 TABLET ORAL at 00:57

## 2017-01-16 RX ADMIN — Medication 1 TABLET(S): at 10:00

## 2017-01-16 RX ADMIN — Medication 1 PUFF(S): at 15:55

## 2017-01-16 RX ADMIN — Medication 250 MILLIGRAM(S): at 23:11

## 2017-01-16 RX ADMIN — OXYCODONE HYDROCHLORIDE 15 MILLIGRAM(S): 5 TABLET ORAL at 09:08

## 2017-01-16 RX ADMIN — Medication 1 TABLET(S): at 01:30

## 2017-01-16 RX ADMIN — Medication 250 MILLIGRAM(S): at 00:56

## 2017-01-16 RX ADMIN — OXYCODONE HYDROCHLORIDE 30 MILLIGRAM(S): 5 TABLET ORAL at 01:30

## 2017-01-16 RX ADMIN — Medication 25 MILLIGRAM(S): at 07:22

## 2017-01-16 RX ADMIN — OXYCODONE HYDROCHLORIDE 30 MILLIGRAM(S): 5 TABLET ORAL at 03:56

## 2017-01-16 NOTE — PROVIDER CONTACT NOTE (OTHER) - RECOMMENDATIONS
Since patient refused to give her medications, Dr. Nair spoke with pt regarding "overdosing" if pt taking her own meds along with medications given from hospital, pt had verbalized understanding and -

## 2017-01-16 NOTE — PROVIDER CONTACT NOTE (OTHER) - ASSESSMENT
resting in bed comfortably, and verbalized understanding of education given regarding blood draw and blood glucose FS need

## 2017-01-16 NOTE — PROGRESS NOTE ADULT - SUBJECTIVE AND OBJECTIVE BOX
INTERVAL HPI/OVERNIGHT EVENTS: ANAT. Patient complaining of persistent chronic pain. Breathing improved from yesterday.     VITAL SIGNS:  Vital Signs Last 24 Hrs  T(C): 36.3, Max: 37.2 (01-15 @ 17:55)  T(F): 97.3, Max: 99 (15 @ 17:55)  HR: 93 (89 - 93)  BP: 156/74 (121/61 - 156/74)  BP(mean): --  RR: 17 (16 - 17)  SpO2: 96% on RA    PHYSICAL EXAM:    Constitutional: frail elderly woman, NAD, A&O x3.   ENMT: MMM  Respiratory: Breathing comfortably, reduced breath sounds   Cardiovascular:  Gastrointestinal:  Extremities:  Vascular:  Neurological:  Musculoskeletal:    MEDICATIONS  (STANDING):  aspirin enteric coated 81milliGRAM(s) Oral daily  metoprolol succinate ER 25milliGRAM(s) Oral daily  heparin  Injectable 5000Unit(s) SubCutaneous every 8 hours  ciprofloxacin     Tablet 250milliGRAM(s) Oral every 24 hours  predniSONE   Tablet 40milliGRAM(s) Oral daily  multivitamin 1Tablet(s) Oral daily  insulin lispro (HumaLOG) corrective regimen sliding scale  SubCutaneous Before meals and at bedtime  ipratropium 17 MICROgram(s) HFA Inhaler 1Puff(s) Inhalation every 4 hours  polyethylene glycol 3350 17Gram(s) Oral daily  senna 2Tablet(s) Oral at bedtime  docusate sodium 100milliGRAM(s) Oral daily    MEDICATIONS  (PRN):  acetaminophen/butalbital/caffeine 1Tablet(s) Oral two times a day PRN headache  acetaminophen   Tablet. 650milliGRAM(s) Oral every 6 hours PRN Moderate Pain (4 - 6)  oxyCODONE IR 15milliGRAM(s) Oral every 6 hours PRN Moderate Pain (4 - 6)      Allergies    apple (Unknown)  penicillin (Unknown)    Intolerances        LABS:                        7.8    5.6   )-----------( 248      ( 2017 08:13 )             24.8     2017 08:13    140    |  106    |  28     ----------------------------<  84     4.8     |  26     |  1.17     Ca    8.6        2017 08:13  Mg     2.1       2017 08:13        Urinalysis Basic - ( 2017 16:03 )    Color: Yellow / Appearance: Clear / S.020 / pH: x  Gluc: x / Ketone: NEGATIVE  / Bili: NEGATIVE / Urobili: 0.2 E.U./dL   Blood: x / Protein: Trace mg/dL / Nitrite: NEGATIVE   Leuk Esterase: Small / RBC: x / WBC Many /HPF   Sq Epi: x / Non Sq Epi: Few /HPF / Bacteria: Present /HPF        RADIOLOGY & ADDITIONAL TESTS: INTERVAL HPI/OVERNIGHT EVENTS: ANAT. Patient complaining of persistent chronic pain. Breathing improved from yesterday.     VITAL SIGNS:  Vital Signs Last 24 Hrs  T(C): 36.3, Max: 37.2 (01-15 @ 17:55)  T(F): 97.3, Max: 99 (15 @ 17:55)  HR: 93 (89 - 93)  BP: 156/74 (121/61 - 156/74)  BP(mean): --  RR: 17 (16 - 17)  SpO2: 96% on RA    PHYSICAL EXAM:    Constitutional: frail elderly woman, NAD, A&O x3.   ENMT: MMM  Respiratory: Breathing comfortably, reduced breath sounds at bases. Severe kyphosis.   Cardiovascular: RRR. +S1, S2. No murmurs.   Gastrointestinal: soft, NTND  Extremities: No edema. WWP      MEDICATIONS  (STANDING):  aspirin enteric coated 81milliGRAM(s) Oral daily  metoprolol succinate ER 25milliGRAM(s) Oral daily  heparin  Injectable 5000Unit(s) SubCutaneous every 8 hours  ciprofloxacin     Tablet 250milliGRAM(s) Oral every 24 hours  predniSONE   Tablet 40milliGRAM(s) Oral daily  multivitamin 1Tablet(s) Oral daily  insulin lispro (HumaLOG) corrective regimen sliding scale  SubCutaneous Before meals and at bedtime  ipratropium 17 MICROgram(s) HFA Inhaler 1Puff(s) Inhalation every 4 hours  polyethylene glycol 3350 17Gram(s) Oral daily  senna 2Tablet(s) Oral at bedtime  docusate sodium 100milliGRAM(s) Oral daily    MEDICATIONS  (PRN):  acetaminophen/butalbital/caffeine 1Tablet(s) Oral two times a day PRN headache  acetaminophen   Tablet. 650milliGRAM(s) Oral every 6 hours PRN Moderate Pain (4 - 6)  oxyCODONE IR 15milliGRAM(s) Oral every 6 hours PRN Moderate Pain (4 - 6)      Allergies    apple (Unknown)  penicillin (Unknown)    Intolerances        LABS:                        7.8    5.6   )-----------( 248      ( 2017 08:13 )             24.8     2017 08:13    140    |  106    |  28     ----------------------------<  84     4.8     |  26     |  1.17     Ca    8.6        2017 08:13  Mg     2.1       2017 08:13        Urinalysis Basic - ( 2017 16:03 )    Color: Yellow / Appearance: Clear / S.020 / pH: x  Gluc: x / Ketone: NEGATIVE  / Bili: NEGATIVE / Urobili: 0.2 E.U./dL   Blood: x / Protein: Trace mg/dL / Nitrite: NEGATIVE   Leuk Esterase: Small / RBC: x / WBC Many /HPF   Sq Epi: x / Non Sq Epi: Few /HPF / Bacteria: Present /HPF        RADIOLOGY & ADDITIONAL TESTS:

## 2017-01-16 NOTE — CHART NOTE - NSCHARTNOTEFT_GEN_A_CORE
Informed by nurse that patient taking home prescription opioids. Spoke with patient in depth about how taking home medications without doctor approval in a hospital is against policy and could be dangerous for her health. Patient refused to give nurse home meds. Patient understood the risks of behavior and has decision making capacity.

## 2017-01-16 NOTE — PROVIDER CONTACT NOTE (OTHER) - SITUATION
pt refused vital signs and Blood Glucose FS, CT Scan to be done. Also pt has a pills of Oxycodone 30mg IR tablet (pt's own meds), and as per pt " I took my own meds". pt refused to give her medication

## 2017-01-16 NOTE — PROGRESS NOTE ADULT - SUBJECTIVE AND OBJECTIVE BOX
71 year old female who went to ER c/o SOB, SWEET and failure to thrive. Frequent visits to various emergency rooms. Sees pain management physician for complaints of chronic pain. Initial CBC in ER showed low HCT but repeat was normal. Bacteriuria on urinalysis.    Patient complaining about her need for more pain meds    PAST MEDICAL & SURGICAL HISTORY:  CAD (coronary artery disease)  Arthritis  COPD (chronic obstructive pulmonary disease)  H/O appendicitis  F PMH COPD (not on home O2) c/b multiple hospitalizations for exacerbations/intubations, chronic pain, MI, admitted 1/14 for SOB x 1 day likely 2/2 COPD exacerbation.    Allergies:  penicillin (Unknown)    MEDICATIONS  (STANDING):  aspirin enteric coated 81milliGRAM(s) Oral daily  metoprolol succinate ER 25milliGRAM(s) Oral daily  heparin  Injectable 5000Unit(s) SubCutaneous every 8 hours  ciprofloxacin     Tablet 250milliGRAM(s) Oral every 24 hours  predniSONE   Tablet 40milliGRAM(s) Oral daily  multivitamin 1Tablet(s) Oral daily  insulin lispro (HumaLOG) corrective regimen sliding scale  SubCutaneous Before meals and at bedtime  ipratropium 17 MICROgram(s) HFA Inhaler 1Puff(s) Inhalation every 4 hours  polyethylene glycol 3350 17Gram(s) Oral daily  senna 2Tablet(s) Oral at bedtime  docusate sodium 100milliGRAM(s) Oral daily    MEDICATIONS  (PRN):  acetaminophen/butalbital/caffeine 1Tablet(s) Oral two times a day PRN headache  acetaminophen   Tablet. 650milliGRAM(s) Oral every 6 hours PRN Moderate Pain (4 - 6)  oxyCODONE IR 15milliGRAM(s) Oral every 6 hours PRN Moderate Pain (4 - 6)    Vital Signs Last 24 Hrs  T(C): 36.3, Max: 37.2 (01-15 @ 17:55)  T(F): 97.3, Max: 99 (01-15 @ 17:55)  HR: 93 (89 - 93)  BP: 156/74 (121/61 - 156/74)  BP(mean): --  RR: 17 (16 - 17)  SpO2: 100% (100% - 100%)  General: kyphotic, cachectic elder female, poor historian, tangential  HEENT: no JVD  Respiratory: diminished breath sounds b/l, scattered wheezes  Cardiovascular: S1, S2, no rubs or murmurs  Abdomen: Soft, normoactive BS, no masses or tenderness.  Extremities: contracted, no edema             16 Jan 2017 08:13    140    |  106    |  28     ----------------------------<  84     4.8     |  26     |  1.17     Ca    8.6        16 Jan 2017 08:13  Mg     2.1       16 Jan 2017 08:13                            7.8    5.6   )-----------( 248      ( 16 Jan 2017 08:13 )             24.8

## 2017-01-16 NOTE — PROGRESS NOTE ADULT - ASSESSMENT
elderly female with multiple somatic complaints  COPD  contracted, cachexic, kyphotic    Pulmonary consult-Dr. Neves  Pain Management consult  Cardiology  social service  supportive care

## 2017-01-16 NOTE — PROGRESS NOTE ADULT - SUBJECTIVE AND OBJECTIVE BOX
INTERVAL HPI/OVERNIGHT EVENTS:    has BM today  -+CP  +SOB  wants more oxycodon (celebrex candidate)    PAST MEDICAL & SURGICAL HISTORY:  CAD (coronary artery disease)  Arthritis  COPD (chronic obstructive pulmonary disease)  H/O appendicitis    FAMILY HISTORY:    SOCIAL HISTORY:    REVIEW OF SYSTEMS:  no significant change    Vital Signs Last 24 Hrs  T(C): 36.3, Max: 37.2 (01-15 @ 17:55)  T(F): 97.3, Max: 99 (01-15 @ 17:55)  HR: 93 (89 - 93)  BP: 156/74 (121/61 - 156/74)  BP(mean): --  RR: 17 (16 - 17)  SpO2: 100% (100% - 100%)    I&O's Detail    PHYSICAL EXAM:  in bed  malnourished, developed, comfortable, - acute distress; vital signs are monitored  Eyes: PERRLA, EOMI, -conjunctivitis, -scleritis   Head: no focal deficit, normocephalic,  no trauma  ENMT: moist tongue, no thrush, -nasal discharge, -hoarseness, normal hearing, -cough, -hemoptysis, trachea midline  Neck: supple, - lymphadenopathy,  -masses, -JVD  Respiratory: +bilateral diminished breath sounds, -wheezing, +rhonchi, -rales, + endinspiratory crackles  Chest: -accessory muscle use, -paradoxical breathing  Cardiovascular: regular rate and sinus rhythm, S1 S2 normal, -S3, -S4, -murmur, -gallop, -rub  Gastrointestinal: soft, nontender, nondistended, normal bowel sounds, no hepatosplenomegaly  Genitourinary: -flank pain, -dysuria  Extremities: +clubbing, -cyanosis, -edema  Vascular: peripheral pulses palpable 2+ bilaterally  Neurological: alert, oriented x 3, no focal deficit, -tremor   Skin: warm, dry, -erythema, iv sites intact  Lymph nodes; no cervical, supraclavicular or axillary adenopathy  Psychiatric: cooperative, appropriate mood    MEDICATIONS  (STANDING):  aspirin enteric coated 81milliGRAM(s) Oral daily  metoprolol succinate ER 25milliGRAM(s) Oral daily  heparin  Injectable 5000Unit(s) SubCutaneous every 8 hours  ciprofloxacin     Tablet 250milliGRAM(s) Oral every 24 hours  predniSONE   Tablet 40milliGRAM(s) Oral daily  multivitamin 1Tablet(s) Oral daily  insulin lispro (HumaLOG) corrective regimen sliding scale  SubCutaneous Before meals and at bedtime  ipratropium 17 MICROgram(s) HFA Inhaler 1Puff(s) Inhalation every 4 hours  polyethylene glycol 3350 17Gram(s) Oral daily  senna 2Tablet(s) Oral at bedtime  docusate sodium 100milliGRAM(s) Oral daily    MEDICATIONS  (PRN):  acetaminophen/butalbital/caffeine 1Tablet(s) Oral two times a day PRN headache  acetaminophen   Tablet. 650milliGRAM(s) Oral every 6 hours PRN Moderate Pain (4 - 6)  oxyCODONE IR 15milliGRAM(s) Oral every 6 hours PRN Moderate Pain (4 - 6)    Allergies  apple (Unknown)  penicillin (Unknown)  Intolerances    LABS:                        7.8    5.6   )-----------( 248      ( 16 Jan 2017 08:13 )             24.8     16 Jan 2017 08:13    140    |  106    |  28     ----------------------------<  84     4.8     |  26     |  1.17     Ca    8.6        16 Jan 2017 08:13  Mg     2.1       16 Jan 2017 08:13    +DVT prophylaxis  +Sleep  +Nutrition goals  -Pain  -Decubital ulcer  +GI prophylaxis (PPV, coagulopathy, Hx)  +Aspiration prophylaxis (45 degrees)    +Sedation/analgesia stopped  +ID (phos, CH, mupi, SB)  -Delirium    +Cardiac Beta/ASA  +Prevention  +Education  +Medication reviewed (drug-drug interactions, PDA)  Medical devices    Discussed with PGY, CCRN    RADIOLOGY & ADDITIONAL STUDIES:    CT pending

## 2017-01-16 NOTE — PROVIDER CONTACT NOTE (OTHER) - SITUATION
pt refused lab blood draws and refused blood glucose FS to be done despite education and encouragement given

## 2017-01-16 NOTE — PROVIDER CONTACT NOTE (OTHER) - REASON
refusal of vital signs, CT-Scan, blood glucose FS and possesion of Oxycodone 30mg IR tablet at bedside(pt's own med)

## 2017-01-17 RX ORDER — OXYCODONE HYDROCHLORIDE 5 MG/1
30 TABLET ORAL EVERY 4 HOURS
Qty: 0 | Refills: 0 | Status: DISCONTINUED | OUTPATIENT
Start: 2017-01-17 | End: 2017-01-17

## 2017-01-17 RX ORDER — OXYCODONE HYDROCHLORIDE 5 MG/1
30 TABLET ORAL ONCE
Qty: 0 | Refills: 0 | Status: DISCONTINUED | OUTPATIENT
Start: 2017-01-17 | End: 2017-01-17

## 2017-01-17 RX ORDER — DOCUSATE SODIUM 100 MG
100 CAPSULE ORAL
Qty: 0 | Refills: 0 | Status: DISCONTINUED | OUTPATIENT
Start: 2017-01-17 | End: 2017-01-18

## 2017-01-17 RX ORDER — IPRATROPIUM/ALBUTEROL SULFATE 18-103MCG
3 AEROSOL WITH ADAPTER (GRAM) INHALATION EVERY 6 HOURS
Qty: 0 | Refills: 0 | Status: DISCONTINUED | OUTPATIENT
Start: 2017-01-17 | End: 2017-01-18

## 2017-01-17 RX ORDER — FERROUS SULFATE 325(65) MG
325 TABLET ORAL
Qty: 0 | Refills: 0 | Status: DISCONTINUED | OUTPATIENT
Start: 2017-01-17 | End: 2017-01-18

## 2017-01-17 RX ORDER — OXYCODONE HYDROCHLORIDE 5 MG/1
30 TABLET ORAL
Qty: 0 | Refills: 0 | Status: DISCONTINUED | OUTPATIENT
Start: 2017-01-17 | End: 2017-01-18

## 2017-01-17 RX ORDER — POLYETHYLENE GLYCOL 3350 17 G/17G
17 POWDER, FOR SOLUTION ORAL DAILY
Qty: 0 | Refills: 0 | Status: DISCONTINUED | OUTPATIENT
Start: 2017-01-17 | End: 2017-01-18

## 2017-01-17 RX ADMIN — OXYCODONE HYDROCHLORIDE 30 MILLIGRAM(S): 5 TABLET ORAL at 09:46

## 2017-01-17 RX ADMIN — OXYCODONE HYDROCHLORIDE 30 MILLIGRAM(S): 5 TABLET ORAL at 19:18

## 2017-01-17 RX ADMIN — Medication 1 TABLET(S): at 07:00

## 2017-01-17 RX ADMIN — Medication 325 MILLIGRAM(S): at 12:59

## 2017-01-17 RX ADMIN — OXYCODONE HYDROCHLORIDE 30 MILLIGRAM(S): 5 TABLET ORAL at 18:10

## 2017-01-17 RX ADMIN — Medication 325 MILLIGRAM(S): at 18:14

## 2017-01-17 RX ADMIN — OXYCODONE HYDROCHLORIDE 30 MILLIGRAM(S): 5 TABLET ORAL at 14:19

## 2017-01-17 RX ADMIN — OXYCODONE HYDROCHLORIDE 30 MILLIGRAM(S): 5 TABLET ORAL at 21:31

## 2017-01-17 RX ADMIN — Medication 1 TABLET(S): at 12:59

## 2017-01-17 RX ADMIN — OXYCODONE HYDROCHLORIDE 30 MILLIGRAM(S): 5 TABLET ORAL at 22:49

## 2017-01-17 RX ADMIN — OXYCODONE HYDROCHLORIDE 30 MILLIGRAM(S): 5 TABLET ORAL at 06:00

## 2017-01-17 RX ADMIN — OXYCODONE HYDROCHLORIDE 30 MILLIGRAM(S): 5 TABLET ORAL at 10:27

## 2017-01-17 RX ADMIN — OXYCODONE HYDROCHLORIDE 30 MILLIGRAM(S): 5 TABLET ORAL at 13:55

## 2017-01-17 RX ADMIN — Medication 25 MILLIGRAM(S): at 06:02

## 2017-01-17 RX ADMIN — Medication 3 MILLILITER(S): at 13:00

## 2017-01-17 RX ADMIN — OXYCODONE HYDROCHLORIDE 30 MILLIGRAM(S): 5 TABLET ORAL at 07:00

## 2017-01-17 RX ADMIN — Medication 40 MILLIGRAM(S): at 06:02

## 2017-01-17 RX ADMIN — Medication 81 MILLIGRAM(S): at 12:59

## 2017-01-17 RX ADMIN — Medication 1 TABLET(S): at 06:04

## 2017-01-17 NOTE — PROGRESS NOTE ADULT - PROBLEM SELECTOR PLAN 3
Patient with Hb 7.8, decrease from 10.0 in Nov. Patient denies bleeding. Stool Guaiac in ED neg.  - F/u iron studies  - Monitor CBC Hg was stable over last several days (high 7's). Single reading of Hg 10 was correlated with new T&S blood type and is most likely was a different patient. Iron studies consistent with iron deficiency. Will start PO iron. Pt refused morning labs but will continue to order CBC.

## 2017-01-17 NOTE — CONSULT NOTE ADULT - CONSULT REASON
71 year old female with PMH significant for HTN, COPD (admission), and FMF was seen at bedside for management of her uncontrolled pain.

## 2017-01-17 NOTE — CONSULT NOTE ADULT - SUBJECTIVE AND OBJECTIVE BOX
Pain Management Consult Note - [facility] [phone]    Chief Complaint: 71 year old female presents with pain in her right and left lower quadrants of the abdomen and migraines.     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  Since that time patient has been given oxycodone for pain management associated with her condition.  Patient states that she has taken 30mg of oxycodone every 4 hours since she was a little girl without interruption until recently she was prescribed "30mg every 3 hours".  Patient denies ever have gastrointestinal upset, nor constipation and has therefore refused any laxatives since her admission stating "I have a bowel movement twice a day without laxatives, so there is no point to take them."  For the past 3 years, patient has seen Dr. Stewart as her pain management doctor, however, since April of this year she has not held regular appointments due to a series of hospital admissions secondary to her COPD exacerbation (reported reason for current visit).  Patient notes a history of migraines that were previously treated with fioricet, however, she is not taking fioricet currently even though previous management was successful.        Pain is _x__ sharp ____dull ___burning ___achy ___ Intensity: ____ mild _x__mod ___severe     Location ____surgical site ____cervical _____lumbar _x___abd ____upper ext____lower ext    Worse with _x___activity _x___movement _____physical therapy___ Rest    Improved with _x___medication ____rest ____physical therapy    ROS: Const:  ___febrile   Eyes:___ENT:___CV: ___chest pain  Resp: ____sob  GI:___nausea ___vomiting ___abd pain _x__npo ___clears __full diet __bm  :___ Musk: ___pain ___spasm  Skin:___ Neuro:  ___sedation___confusion___ numbness ___weakness ___paresth  Psych:__anxiety  Endo:___ Heme:___Allergy:_________, ___all others reviewed and negative    PAST MEDICAL & SURGICAL HISTORY:  CAD (coronary artery disease)  Arthritis  COPD (chronic obstructive pulmonary disease)  H/O appendicitis      SH: _former smoker__Tobacco   _denies__Alcohol   denies illicit drug use                       FH:FAMILY HISTORY: unknown      aspirin enteric coated 81milliGRAM(s) Oral daily  metoprolol succinate ER 25milliGRAM(s) Oral daily  heparin  Injectable 5000Unit(s) SubCutaneous every 8 hours  acetaminophen   Tablet. 650milliGRAM(s) Oral every 6 hours PRN  predniSONE   Tablet 40milliGRAM(s) Oral daily  multivitamin 1Tablet(s) Oral daily  insulin lispro (HumaLOG) corrective regimen sliding scale  SubCutaneous Before meals and at bedtime  ALBUTerol/ipratropium for Nebulization 3milliLiter(s) Nebulizer every 6 hours  oxyCODONE IR 30milliGRAM(s) Oral every 4 hours PRN  polyethylene glycol 3350 17Gram(s) Oral daily  docusate sodium 100milliGRAM(s) Oral two times a day  ferrous    sulfate 325milliGRAM(s) Oral three times a day with meals      T(C): 36.5, Max: 37 (01-16 @ 20:55)  HR: 78 (76 - 92)  BP: 141/61 (140/85 - 145/63)  RR: 17 (17 - 17)  SpO2: 97% (97% - 98%)  Wt(kg): --    T(C): 36.5, Max: 37 (01-16 @ 20:55)  HR: 78 (76 - 92)  BP: 141/61 (140/85 - 145/63)  RR: 17 (17 - 17)  SpO2: 97% (97% - 98%)  Wt(kg): --    T(C): 36.5, Max: 37 (01-16 @ 20:55)  HR: 78 (76 - 92)  BP: 141/61 (140/85 - 145/63)  RR: 17 (17 - 17)  SpO2: 97% (97% - 98%)  Wt(kg): --    PHYSICAL EXAM:  Gen Appearance: _x__no acute distress _x__appropriate        Neuro: _x__SILT feet__x__ EOM Intact Psych: AAOX__, _x__mood/affect appropriate        Eyes: _x__conjunctiva WNL  __x___ Pupils equal and round        ENT: __x_ears and nose atraumatic___ Hearing grossly intact        Neck: __x_trachea midline, no visible masses ___thyroid without palpable mass    Resp: _x__Nml WOB____No tactile fremitus ___clear to auscultation    Cardio: _x__extremities free from edema ____pedal pulses palpable    GI/Abdomen: _x__soft __x___ tender___x___Nondistended_____HSM    Lymphatic: ___no palpable nodes in neck  ___no palpable nodes calves and feet    Skin/Wound: ___Incision, ___Dressing c/d/i,   ____surrounding tissues soft,  ___drain/chest tube present____    Muscular: EHL _5__/5  Gastrocnemius_5__/5    _x__absent clubbing/cyanosis      ASSESSMENT: This is a 71y old Female with a history of   COPD EXACERBATION: COPD EXACERBATION  No h/o HF: OrderEntryButtonClicked:2017-01-15 17:31:22  Unknown h/o HF: OrderEntryButtonClicked:2017-01-15 10:26:21  Handoff  MEWS Score: 3 (17-Jan-2017 08:47)  CAD (coronary artery disease)  Arthritis  COPD (chronic obstructive pulmonary disease)  CAD (coronary artery disease)  Arthritis  COPD (chronic obstructive pulmonary disease)  COPD exacerbation  Hypoxia: Hypoxia  Weight loss: Weight loss  Chronic obstructive pulmonary disease, unspecified COPD type: Chronic obstructive pulmonary disease, unspecified COPD type  Coronary artery disease involving native heart with angina pectoris, unspecified vessel or lesion type: Coronary artery disease involving native heart with angina pectoris, unspecified vessel or lesion type  Anemia, unspecified type: Anemia, unspecified type  Atelectasis: Atelectasis  Sleep disorder: Sleep disorder  Dyspnea, unspecified type: Dyspnea, unspecified type  OLEGARIO (acute kidney injury): OLEGARIO (acute kidney injury)  Nutrition, metabolism, and development symptoms: Nutrition, metabolism, and development symptoms  Prophylactic measure: Prophylactic measure  CAD (coronary artery disease): CAD (coronary artery disease)  Arthritis: Arthritis  UTI (urinary tract infection): UTI (urinary tract infection)  Anemia: Anemia  COPD exacerbation: COPD exacerbation  H/O appendicitis  SOB: SOB  47      Recommended Treatment PLAN:  Continue current oxycodone dosage: 30mg Q4H

## 2017-01-17 NOTE — PROGRESS NOTE ADULT - SUBJECTIVE AND OBJECTIVE BOX
INTERVAL HPI/OVERNIGHT EVENTS: was started overnight on home oxycodone 30mg q4hr prn. pt refused morning labs. pt refused CT chest. pt complaining of chronic pain.      VITAL SIGNS:  Vital Signs Last 24 Hrs  T(C): 36.5, Max: 37 (01-16 @ 20:55)  T(F): 97.7, Max: 98.6 (01-16 @ 20:55)  HR: 78 (76 - 92)  BP: 141/61 (140/85 - 145/63)  BP(mean): --  RR: 17 (17 - 17)  SpO2: 97% (97% - 98%) RA    PHYSICAL EXAM:    Constitutional: elderly woman, frail, severe kyphosis, NAD  ENMT: MMM  Respiratory: reduced bs at bases.   Cardiovascular: RRR. +S1, S2 no murmurs or gallops  Gastrointestinal: diffusely tender.   Extremities: No edema. WWP      MEDICATIONS  (STANDING):  aspirin enteric coated 81milliGRAM(s) Oral daily  metoprolol succinate ER 25milliGRAM(s) Oral daily  heparin  Injectable 5000Unit(s) SubCutaneous every 8 hours  predniSONE   Tablet 40milliGRAM(s) Oral daily  multivitamin 1Tablet(s) Oral daily  insulin lispro (HumaLOG) corrective regimen sliding scale  SubCutaneous Before meals and at bedtime  ALBUTerol/ipratropium for Nebulization 3milliLiter(s) Nebulizer every 6 hours  polyethylene glycol 3350 17Gram(s) Oral daily  docusate sodium 100milliGRAM(s) Oral two times a day    MEDICATIONS  (PRN):  acetaminophen   Tablet. 650milliGRAM(s) Oral every 6 hours PRN Moderate Pain (4 - 6)  oxyCODONE IR 30milliGRAM(s) Oral every 4 hours PRN Severe Pain (7 - 10)      Allergies    apple (Unknown)  penicillin (Unknown)    Intolerances        LABS:                        7.8    5.6   )-----------( 248      ( 16 Jan 2017 08:13 )             24.8     16 Jan 2017 08:13    140    |  106    |  28     ----------------------------<  84     4.8     |  26     |  1.17     Ca    8.6        16 Jan 2017 08:13  Mg     2.1       16 Jan 2017 08:13            RADIOLOGY & ADDITIONAL TESTS:

## 2017-01-17 NOTE — PROGRESS NOTE ADULT - SUBJECTIVE AND OBJECTIVE BOX
INTERVAL HPI/OVERNIGHT EVENTS:    SOB in am  "they do not give me my pain medication"    PAST MEDICAL & SURGICAL HISTORY:  CAD (coronary artery disease)  Arthritis  COPD (chronic obstructive pulmonary disease)  H/O appendicitis    FAMILY HISTORY:    SOCIAL HISTORY:    REVIEW OF SYSTEMS:  pain, and "tears down my face"    Vital Signs Last 24 Hrs  T(C): 36.5, Max: 37 (01-16 @ 20:55)  T(F): 97.7, Max: 98.6 (01-16 @ 20:55)  HR: 78 (76 - 92)  BP: 141/61 (140/85 - 145/63)  BP(mean): --  RR: 17 (17 - 17)  SpO2: 97% (97% - 98%)  I&O's Detail    PHYSICAL EXAM:  in bed with multiple complaints, including chest pressure  nasal O2  Malnourished, cachectic, uncomfortable, - acute distress; vital signs are monitored  Eyes: PERRLA, EOMI, -conjunctivitis, -scleritis   Head: no focal deficit, normocephalic,  no trauma  ENMT: moist tongue, no thrush, -nasal discharge, -hoarseness, normal hearing, -cough, -hemoptysis, trachea midline  Neck: supple, - lymphadenopathy,  -masses, -JVD  Respiratory: bilateral diminished breath sounds, -wheezing, +rhonchi, -rales, -crackles  Chest: -accessory muscle use, -paradoxical breathing  Cardiovascular: regular rate and sinus rhythm, S1 S2 normal, -S3, -S4, -murmur, -gallop, -rub  Gastrointestinal: soft, nontender, nondistended, normal bowel sounds, no hepatosplenomegaly  Genitourinary: -flank pain, -dysuria  Extremities: -clubbing, -cyanosis, -edema   Vascular: peripheral pulses palpable 2+ bilaterally  Neurological: alert, oriented x 3, no focal deficit, -tremor   Skin: warm, dry, -erythema, iv sites intact  Lymph nodes; no cervical, supraclavicular or axillary adenopathy  Psychiatric: cooperative, appropriate mood    MEDICATIONS  (STANDING):  aspirin enteric coated 81milliGRAM(s) Oral daily  metoprolol succinate ER 25milliGRAM(s) Oral daily  heparin  Injectable 5000Unit(s) SubCutaneous every 8 hours  predniSONE   Tablet 40milliGRAM(s) Oral daily  multivitamin 1Tablet(s) Oral daily  insulin lispro (HumaLOG) corrective regimen sliding scale  SubCutaneous Before meals and at bedtime  ALBUTerol/ipratropium for Nebulization 3milliLiter(s) Nebulizer every 6 hours  polyethylene glycol 3350 17Gram(s) Oral daily  docusate sodium 100milliGRAM(s) Oral two times a day  ferrous    sulfate 325milliGRAM(s) Oral three times a day with meals    MEDICATIONS  (PRN):  acetaminophen   Tablet. 650milliGRAM(s) Oral every 6 hours PRN Moderate Pain (4 - 6)  oxyCODONE IR 30milliGRAM(s) Oral every 4 hours PRN Severe Pain (7 - 10)    Allergies  apple (Unknown)  penicillin (Unknown)  Intolerances    LABS:                        7.8    5.6   )-----------( 248      ( 16 Jan 2017 08:13 )             24.8     16 Jan 2017 08:13    140    |  106    |  28     ----------------------------<  84     4.8     |  26     |  1.17     Ca    8.6        16 Jan 2017 08:13  Mg     2.1       16 Jan 2017 08:13    +DVT prophylaxis  +Sleep  +Nutrition goals  +++Pain - diffuse  -Decubital ulcer  +GI prophylaxis (PPV, coagulopathy, Hx)  +Aspiration prophylaxis (45 degrees)    +Sedation/analgesia stopped  +ID (phos, CH, mupi, SB)  -Delirium    +Cardiac Beta/ASA  +Prevention  +Education  +Medication reviewed (drug-drug interactions, PDA)  Medical devices    Discussed with PT, PGY, RN    RADIOLOGY & ADDITIONAL STUDIES:    refusiing CT

## 2017-01-17 NOTE — PROGRESS NOTE ADULT - ASSESSMENT
71 y.o F PMH of COPD not on home O2, CAD, chronic MSK pain presenting to the ED for SOB and admitted for tx of COPD exacerbation. Now pending SW evaluation for dispo planning.

## 2017-01-17 NOTE — PROGRESS NOTE ADULT - SUBJECTIVE AND OBJECTIVE BOX
71 year old female who went to ER c/o SOB, SWEET and failure to thrive. Frequent visits to various emergency rooms. Sees pain management physician for complaints of chronic pain. Initial CBC in ER showed low HCT but repeat was normal. Bacteriuria on urinalysis.    Generally uncooperative. Refusing PT, CT. Keeps asking for more pain meds    PAST MEDICAL & SURGICAL HISTORY:  CAD (coronary artery disease)  Arthritis  COPD (chronic obstructive pulmonary disease)  H/O appendicitis  F PMH COPD (not on home O2) c/b multiple hospitalizations for exacerbations/intubations, chronic pain, MI, admitted 1/14 for SOB x 1 day likely 2/2 COPD exacerbation.    Allergies:  penicillin (Unknown)    MEDICATIONS  (STANDING):  aspirin enteric coated 81milliGRAM(s) Oral daily  metoprolol succinate ER 25milliGRAM(s) Oral daily  heparin  Injectable 5000Unit(s) SubCutaneous every 8 hours  ciprofloxacin     Tablet 250milliGRAM(s) Oral every 24 hours  predniSONE   Tablet 40milliGRAM(s) Oral daily  multivitamin 1Tablet(s) Oral daily  insulin lispro (HumaLOG) corrective regimen sliding scale  SubCutaneous Before meals and at bedtime  ipratropium 17 MICROgram(s) HFA Inhaler 1Puff(s) Inhalation every 4 hours  polyethylene glycol 3350 17Gram(s) Oral daily  senna 2Tablet(s) Oral at bedtime  docusate sodium 100milliGRAM(s) Oral daily    MEDICATIONS  (PRN):  acetaminophen/butalbital/caffeine 1Tablet(s) Oral two times a day PRN headache  acetaminophen   Tablet. 650milliGRAM(s) Oral every 6 hours PRN Moderate Pain (4 - 6)  oxyCODONE IR 15milliGRAM(s) Oral every 6 hours PRN Moderate Pain (4 - 6)    Vital Signs Last 24 Hrs  ICU Vital Signs Last 24 Hrs  T(C): 36.5, Max: 37 (01-16 @ 20:55)  T(F): 97.7, Max: 98.6 (01-16 @ 20:55)  HR: 73 (73 - 92)  BP: 167/78 (140/85 - 167/78)  BP(mean): --  ABP: --  ABP(mean): --  RR: 17 (17 - 17)  SpO2: 99% (97% - 100%)  General: kyphotic, cachectic elder female, poor historian, tangential  HEENT: no JVD  Respiratory: diminished breath sounds b/l, scattered wheezes  Cardiovascular: S1, S2, no rubs or murmurs  Abdomen: Soft, normoactive BS, no masses or tenderness.  Extremities: contracted, no edema                          7.8    5.6   )-----------( 248      ( 16 Jan 2017 08:13 )             24.8     16 Jan 2017 08:13    140    |  106    |  28     ----------------------------<  84     4.8     |  26     |  1.17     Ca    8.6        16 Jan 2017 08:13  Mg     2.1       16 Jan 2017 08:13

## 2017-01-17 NOTE — PROGRESS NOTE ADULT - ASSESSMENT
elderly female with multiple somatic complaints  drug seeking behavior  COPD  contracted, cachexic, kyphotic  noncomplaint  seen by pain management    discharge planning  social service  supportive care

## 2017-01-17 NOTE — PROGRESS NOTE ADULT - PROBLEM SELECTOR PLAN 1
Improved on prednisone and nebs. Now breathing comfortably on RA. Dr. Keller following. Recommended for CT chest but pt refused x 2. Pt with severe kyphosis and likely restrictive component. Will c/w duonebs and consider dc'ing or tapering prednisone.

## 2017-01-17 NOTE — PROGRESS NOTE ADULT - PROBLEM SELECTOR PLAN 2
Patient w/ history of chronic pain. Patient reports working w/ pain management specialists. Attempted to Cont home med Oxycodone 30mg q3h PRN  - Pain management consult in AM Patient w/ history of chronic pain. Patient reports working w/ pain management specialists. Attempted to start long acting but pt refused. She continued to take her home meds she brought to hospital w/o MD approval. Switched pain meds to home dose of Oxycodone 30mg q3h PRN. Ordered for bowel regimen.   - Pain management consult in AM

## 2017-01-18 VITALS
TEMPERATURE: 98 F | OXYGEN SATURATION: 100 % | HEART RATE: 69 BPM | RESPIRATION RATE: 16 BRPM | DIASTOLIC BLOOD PRESSURE: 67 MMHG | SYSTOLIC BLOOD PRESSURE: 121 MMHG

## 2017-01-18 PROCEDURE — 94640 AIRWAY INHALATION TREATMENT: CPT

## 2017-01-18 PROCEDURE — 83880 ASSAY OF NATRIURETIC PEPTIDE: CPT

## 2017-01-18 PROCEDURE — 99285 EMERGENCY DEPT VISIT HI MDM: CPT | Mod: 25

## 2017-01-18 PROCEDURE — 87040 BLOOD CULTURE FOR BACTERIA: CPT

## 2017-01-18 PROCEDURE — 86901 BLOOD TYPING SEROLOGIC RH(D): CPT

## 2017-01-18 PROCEDURE — 82728 ASSAY OF FERRITIN: CPT

## 2017-01-18 PROCEDURE — 85025 COMPLETE CBC W/AUTO DIFF WBC: CPT

## 2017-01-18 PROCEDURE — 83605 ASSAY OF LACTIC ACID: CPT

## 2017-01-18 PROCEDURE — 97001: CPT

## 2017-01-18 PROCEDURE — 86900 BLOOD TYPING SEROLOGIC ABO: CPT

## 2017-01-18 PROCEDURE — 82553 CREATINE MB FRACTION: CPT

## 2017-01-18 PROCEDURE — 87086 URINE CULTURE/COLONY COUNT: CPT

## 2017-01-18 PROCEDURE — 85045 AUTOMATED RETICULOCYTE COUNT: CPT

## 2017-01-18 PROCEDURE — 85027 COMPLETE CBC AUTOMATED: CPT

## 2017-01-18 PROCEDURE — 86850 RBC ANTIBODY SCREEN: CPT

## 2017-01-18 PROCEDURE — 71045 X-RAY EXAM CHEST 1 VIEW: CPT

## 2017-01-18 PROCEDURE — 81003 URINALYSIS AUTO W/O SCOPE: CPT

## 2017-01-18 PROCEDURE — 93005 ELECTROCARDIOGRAM TRACING: CPT | Mod: 76

## 2017-01-18 PROCEDURE — 83550 IRON BINDING TEST: CPT

## 2017-01-18 PROCEDURE — 80048 BASIC METABOLIC PNL TOTAL CA: CPT

## 2017-01-18 PROCEDURE — 81001 URINALYSIS AUTO W/SCOPE: CPT

## 2017-01-18 PROCEDURE — 84484 ASSAY OF TROPONIN QUANT: CPT

## 2017-01-18 PROCEDURE — 36415 COLL VENOUS BLD VENIPUNCTURE: CPT

## 2017-01-18 PROCEDURE — 83735 ASSAY OF MAGNESIUM: CPT

## 2017-01-18 PROCEDURE — 82550 ASSAY OF CK (CPK): CPT

## 2017-01-18 PROCEDURE — 96374 THER/PROPH/DIAG INJ IV PUSH: CPT

## 2017-01-18 PROCEDURE — 84466 ASSAY OF TRANSFERRIN: CPT

## 2017-01-18 RX ORDER — OXYCODONE HYDROCHLORIDE 5 MG/1
30 TABLET ORAL ONCE
Qty: 0 | Refills: 0 | Status: DISCONTINUED | OUTPATIENT
Start: 2017-01-18 | End: 2017-01-18

## 2017-01-18 RX ORDER — FERROUS SULFATE 325(65) MG
1 TABLET ORAL
Qty: 90 | Refills: 0 | OUTPATIENT
Start: 2017-01-18 | End: 2017-02-17

## 2017-01-18 RX ORDER — IPRATROPIUM BROMIDE 0.2 MG/ML
1 SOLUTION, NON-ORAL INHALATION EVERY 6 HOURS
Qty: 0 | Refills: 0 | Status: DISCONTINUED | OUTPATIENT
Start: 2017-01-18 | End: 2017-01-18

## 2017-01-18 RX ORDER — DOCUSATE SODIUM 100 MG
1 CAPSULE ORAL
Qty: 60 | Refills: 0 | OUTPATIENT
Start: 2017-01-18 | End: 2017-02-17

## 2017-01-18 RX ORDER — OXYCODONE HYDROCHLORIDE 5 MG/1
30 TABLET ORAL EVERY 4 HOURS
Qty: 0 | Refills: 0 | Status: DISCONTINUED | OUTPATIENT
Start: 2017-01-18 | End: 2017-01-18

## 2017-01-18 RX ORDER — OXYCODONE HYDROCHLORIDE 5 MG/1
3 TABLET ORAL
Qty: 0 | Refills: 0 | COMMUNITY

## 2017-01-18 RX ADMIN — OXYCODONE HYDROCHLORIDE 30 MILLIGRAM(S): 5 TABLET ORAL at 14:15

## 2017-01-18 RX ADMIN — OXYCODONE HYDROCHLORIDE 30 MILLIGRAM(S): 5 TABLET ORAL at 07:34

## 2017-01-18 RX ADMIN — Medication 1 PUFF(S): at 04:16

## 2017-01-18 RX ADMIN — Medication 325 MILLIGRAM(S): at 07:10

## 2017-01-18 RX ADMIN — Medication 325 MILLIGRAM(S): at 18:14

## 2017-01-18 RX ADMIN — Medication 1 TABLET(S): at 03:24

## 2017-01-18 RX ADMIN — Medication 1 TABLET(S): at 12:35

## 2017-01-18 RX ADMIN — OXYCODONE HYDROCHLORIDE 30 MILLIGRAM(S): 5 TABLET ORAL at 10:12

## 2017-01-18 RX ADMIN — Medication 1 TABLET(S): at 02:28

## 2017-01-18 RX ADMIN — OXYCODONE HYDROCHLORIDE 30 MILLIGRAM(S): 5 TABLET ORAL at 01:20

## 2017-01-18 RX ADMIN — Medication 1 TABLET(S): at 11:05

## 2017-01-18 RX ADMIN — Medication 25 MILLIGRAM(S): at 05:48

## 2017-01-18 RX ADMIN — OXYCODONE HYDROCHLORIDE 30 MILLIGRAM(S): 5 TABLET ORAL at 14:30

## 2017-01-18 RX ADMIN — OXYCODONE HYDROCHLORIDE 30 MILLIGRAM(S): 5 TABLET ORAL at 20:37

## 2017-01-18 RX ADMIN — Medication 81 MILLIGRAM(S): at 12:35

## 2017-01-18 RX ADMIN — Medication 1 TABLET(S): at 10:38

## 2017-01-18 RX ADMIN — OXYCODONE HYDROCHLORIDE 30 MILLIGRAM(S): 5 TABLET ORAL at 07:09

## 2017-01-18 RX ADMIN — OXYCODONE HYDROCHLORIDE 30 MILLIGRAM(S): 5 TABLET ORAL at 17:40

## 2017-01-18 RX ADMIN — Medication 325 MILLIGRAM(S): at 12:35

## 2017-01-18 RX ADMIN — OXYCODONE HYDROCHLORIDE 30 MILLIGRAM(S): 5 TABLET ORAL at 04:21

## 2017-01-18 RX ADMIN — OXYCODONE HYDROCHLORIDE 30 MILLIGRAM(S): 5 TABLET ORAL at 03:36

## 2017-01-18 RX ADMIN — OXYCODONE HYDROCHLORIDE 30 MILLIGRAM(S): 5 TABLET ORAL at 10:42

## 2017-01-18 RX ADMIN — OXYCODONE HYDROCHLORIDE 30 MILLIGRAM(S): 5 TABLET ORAL at 17:10

## 2017-01-18 RX ADMIN — OXYCODONE HYDROCHLORIDE 30 MILLIGRAM(S): 5 TABLET ORAL at 00:40

## 2017-01-18 RX ADMIN — OXYCODONE HYDROCHLORIDE 30 MILLIGRAM(S): 5 TABLET ORAL at 21:19

## 2017-01-18 RX ADMIN — Medication 20 MILLIGRAM(S): at 05:48

## 2017-01-18 NOTE — PROGRESS NOTE ADULT - PROBLEM SELECTOR PLAN 1
Improved on prednisone and nebs. Reduced pred from 40 > 20. Breathing comfortably on RA for several days now. Dr. Keller following. Recommended for CT chest but pt refused x 2. Pt with severe kyphosis and likely restrictive component. Will c/w sagar.

## 2017-01-18 NOTE — PROGRESS NOTE ADULT - SUBJECTIVE AND OBJECTIVE BOX
INTERVAL HPI/OVERNIGHT EVENTS:    VITAL SIGNS:  Vital Signs Last 24 Hrs  T(C): 37.6, Max: 37.6 (01-18 @ 05:30)  T(F): 99.6, Max: 99.6 (01-18 @ 05:30)  HR: 73 (73 - 86)  BP: 148/70 (130/71 - 167/78)  BP(mean): --  RR: 16 (16 - 17)  SpO2: 100% (97% - 100%)    PHYSICAL EXAM:    Constitutional:  Eyes:  ENMT:  Neck:  Respiratory:  Cardiovascular:  Gastrointestinal:  Extremities:  Vascular:  Neurological:  Musculoskeletal:    MEDICATIONS  (STANDING):  aspirin enteric coated 81milliGRAM(s) Oral daily  metoprolol succinate ER 25milliGRAM(s) Oral daily  heparin  Injectable 5000Unit(s) SubCutaneous every 8 hours  multivitamin 1Tablet(s) Oral daily  insulin lispro (HumaLOG) corrective regimen sliding scale  SubCutaneous Before meals and at bedtime  ALBUTerol/ipratropium for Nebulization 3milliLiter(s) Nebulizer every 6 hours  polyethylene glycol 3350 17Gram(s) Oral daily  docusate sodium 100milliGRAM(s) Oral two times a day  ferrous    sulfate 325milliGRAM(s) Oral three times a day with meals  predniSONE   Tablet 20milliGRAM(s) Oral daily    MEDICATIONS  (PRN):  acetaminophen   Tablet. 650milliGRAM(s) Oral every 6 hours PRN Moderate Pain (4 - 6)  oxyCODONE IR 30milliGRAM(s) Oral every 3 hours PRN Severe Pain (7 - 10)  acetaminophen/butalbital/caffeine 1Tablet(s) Oral two times a day PRN Headache  ipratropium 17 MICROgram(s) HFA Inhaler 1Puff(s) Inhalation every 6 hours PRN SOB      Allergies    apple (Unknown)  penicillin (Unknown)    Intolerances        LABS:                        7.8    5.6   )-----------( 248      ( 16 Jan 2017 08:13 )             24.8     16 Jan 2017 08:13    140    |  106    |  28     ----------------------------<  84     4.8     |  26     |  1.17     Ca    8.6        16 Jan 2017 08:13  Mg     2.1       16 Jan 2017 08:13            RADIOLOGY & ADDITIONAL TESTS: INTERVAL HPI/OVERNIGHT EVENTS: ANAT. Pt breathing better. Pain better controlled. Says she is ready to go home today.     VITAL SIGNS:  Vital Signs Last 24 Hrs  T(C): 37.6, Max: 37.6 (01-18 @ 05:30)  T(F): 99.6, Max: 99.6 (01-18 @ 05:30)  HR: 73 (73 - 86)  BP: 148/70 (130/71 - 167/78)  BP(mean): --  RR: 16 (16 - 17)  SpO2: 100% (97% - 100%) RA    PHYSICAL EXAM:    Constitutional: elderly woman, resting comfortably in bed, A&O x3  ENMT: MMM  Respiratory: CTAB  Cardiovascular: RRR. No murmurs or gallops  Gastrointestinal: diffusely tender to palpation (no change from yesterday)  Extremities: WWP. No edema      MEDICATIONS  (STANDING):  aspirin enteric coated 81milliGRAM(s) Oral daily  metoprolol succinate ER 25milliGRAM(s) Oral daily  heparin  Injectable 5000Unit(s) SubCutaneous every 8 hours  multivitamin 1Tablet(s) Oral daily  insulin lispro (HumaLOG) corrective regimen sliding scale  SubCutaneous Before meals and at bedtime  ALBUTerol/ipratropium for Nebulization 3milliLiter(s) Nebulizer every 6 hours  polyethylene glycol 3350 17Gram(s) Oral daily  docusate sodium 100milliGRAM(s) Oral two times a day  ferrous    sulfate 325milliGRAM(s) Oral three times a day with meals  predniSONE   Tablet 20milliGRAM(s) Oral daily    MEDICATIONS  (PRN):  acetaminophen   Tablet. 650milliGRAM(s) Oral every 6 hours PRN Moderate Pain (4 - 6)  oxyCODONE IR 30milliGRAM(s) Oral every 3 hours PRN Severe Pain (7 - 10)  acetaminophen/butalbital/caffeine 1Tablet(s) Oral two times a day PRN Headache  ipratropium 17 MICROgram(s) HFA Inhaler 1Puff(s) Inhalation every 6 hours PRN SOB      Allergies    apple (Unknown)  penicillin (Unknown)    Intolerances        LABS:                        7.8    5.6   )-----------( 248      ( 16 Jan 2017 08:13 )             24.8     16 Jan 2017 08:13    140    |  106    |  28     ----------------------------<  84     4.8     |  26     |  1.17     Ca    8.6        16 Jan 2017 08:13  Mg     2.1       16 Jan 2017 08:13            RADIOLOGY & ADDITIONAL TESTS:

## 2017-01-18 NOTE — DISCHARGE NOTE ADULT - PLAN OF CARE
to treat your COPD exacerbation You came to the hospital with difficulty breathing. We treated you for an exacerbation of your COPD. We gave you prednisone and nebulizer treatments. Your symptoms improved quickly. Please follow up with your primary care doctor in 1-2 weeks. If you breathing symptoms worsen please see your doctor sooner or go to an emergency room. while you were in the hospital we do not think there was an exacerbation of your coronary artery heart disease. please continue to take your home medications. please follow up with your primary care doctor. you have chronic pain syndrome. we had pain management evaluate you and they recommend you continue with your home oxycodone medication. please continue to take your medications and follow up with your doctor. we checked your urine while you were in the hospital and it appeared that you may have had a urinary tract infection although you did not have any symptoms of such infection. we treated you with 3 days of antibiotics. we found that you were anemic.

## 2017-01-18 NOTE — DISCHARGE NOTE ADULT - HOSPITAL COURSE
71 y.o F PMH of COPD not on home O2 w/ previous intubations and multiple hospital admissions, CAD (no cardiac caths), sciatica w/ chronic pain, ? familial mediterranean fever, and wheelchair bound 2/2 LE injury presenting to the ED c.o SOB.  Vitals in the ED: Tm 100.7 --> 97.8 without intervention -91 BP: 159/84 RR 20 Sat 100% on RA. Pt was treated for COPD exacerbation with levaquin 500mg IV, Mg Sulfate 2g IV, Prednisone 60 PO x1, Duoneb x1. She was admitted to general medical floor for continued treatment of COPD exacerbation. UA was positive for UTI but otherwise asymptomatic, so pt was also treated with cipro x 3 days. Pt's respiratory status improved and steroids were dc'd after several days. Pulm was consulted and recommended CT chest and echo. Pt refused CT chest x 2 and echo x 1. Pt complained of chronic pain during admission. The team tried to control pain with oxycontin but pt continued to take her home oxycodone 30mg and refused to give her home meds to nursing staff. Security was notified. Pain management was also consulted for recs. During admission pt often refused labs, vitals, and meds. She was ordered for bowel regimen meds given her heavy opioid use but she often refused these as well. Pt was medically stable for discharge but.... 71 y.o F PMH of COPD not on home O2 w/ previous intubations and multiple hospital admissions, CAD (no cardiac caths), sciatica w/ chronic pain, ? familial mediterranean fever, and wheelchair bound 2/2 LE injury presenting to the ED c.o SOB.  Vitals in the ED: Tm 100.7 --> 97.8 without intervention -91 BP: 159/84 RR 20 Sat 100% on RA. Pt was treated for COPD exacerbation with levaquin 500mg IV, Mg Sulfate 2g IV, Prednisone 60 PO x1, Duoneb x1. She was admitted to general medical floor for continued treatment of COPD exacerbation. UA was positive for UTI but otherwise asymptomatic, so pt was also treated with cipro x 3 days. Pt's respiratory status improved and steroids were dc'd after several days. Pulm was consulted and recommended CT chest and echo for full w/u of dyspnea. Pt refused CT chest x 2 and echo x 1. Pt complained of chronic pain during admission. The team tried to control pain with oxycontin but pt continued to take her home oxycodone 30mg and refused to give her home meds to nursing staff. Security was notified. Pain management was also consulted for recs. During admission pt often refused labs, vitals, and meds. She was ordered for bowel regimen meds because of her heavy opioid use but she often refused these as well. Pt was determined to have capacity with all the medical decisions encountered during her admission. Pt medically ready stable for discharge (1/18/2017). Pt to return to her friend's house with instructions to follow up with her primary care doctor in 1 week.

## 2017-01-18 NOTE — PROGRESS NOTE ADULT - PROBLEM SELECTOR PLAN 2
Patient w/ history of chronic pain. Patient reports working w/ pain management specialists. Attempted to start long acting but pt refused. She continued to take her home meds she brought to hospital w/o MD approval. Pain management consulted. Switched pain meds to home dose of Oxycodone 30mg q3h PRN. Ordered for bowel regimen. Pt often refuses bowel regimen meds.

## 2017-01-18 NOTE — PROGRESS NOTE ADULT - PROBLEM SELECTOR PROBLEM 5
Anemia, unspecified type
CAD (coronary artery disease)
Nutrition, metabolism, and development symptoms
Nutrition, metabolism, and development symptoms
Anemia, unspecified type

## 2017-01-18 NOTE — PHYSICAL THERAPY INITIAL EVALUATION ADULT - ADDITIONAL COMMENTS
Patient lives in elevator building with no stair to negotiate. Patient mobilizes via wheelchair with inability to ambulate secondary to lack of dorsiflexion (as per patient) and unable to put heels onto floor causing her significant pain.

## 2017-01-18 NOTE — DISCHARGE NOTE ADULT - CARE PLAN
Principal Discharge DX:	COPD exacerbation  Goal:	to treat your COPD exacerbation  Instructions for follow-up, activity and diet:	You came to the hospital with difficulty breathing. We treated you for an exacerbation of your COPD. We gave you prednisone and nebulizer treatments. Your symptoms improved quickly. Please follow up with your primary care doctor in 1-2 weeks. If you breathing symptoms worsen please see your doctor sooner or go to an emergency room.  Secondary Diagnosis:	CAD (coronary artery disease)  Instructions for follow-up, activity and diet:	while you were in the hospital we do not think there was an exacerbation of your coronary artery heart disease. please continue to take your home medications. please follow up with your primary care doctor.  Secondary Diagnosis:	Arthritis  Instructions for follow-up, activity and diet:	you have chronic pain syndrome. we had pain management evaluate you and they recommend you continue with your home oxycodone medication. please continue to take your medications and follow up with your doctor.  Secondary Diagnosis:	UTI (urinary tract infection)  Instructions for follow-up, activity and diet:	we checked your urine while you were in the hospital and it appeared that you may have had a urinary tract infection although you did not have any symptoms of such infection. we treated you with 3 days of antibiotics.  Secondary Diagnosis:	Anemia, unspecified type  Instructions for follow-up, activity and diet:	we found that you were anemic.

## 2017-01-18 NOTE — PROGRESS NOTE ADULT - PROBLEM SELECTOR PLAN 3
Hg was stable over last several days (high 7's). Single reading of Hg 10 was correlated with new T&S blood type and is most likely was a different patient. Iron studies consistent with iron deficiency. Started PO iron.

## 2017-01-18 NOTE — PROGRESS NOTE ADULT - SUBJECTIVE AND OBJECTIVE BOX
INTERVAL HPI/OVERNIGHT EVENTS:    Still refusing CT    PAST MEDICAL & SURGICAL HISTORY:  CAD (coronary artery disease)  Arthritis  COPD (chronic obstructive pulmonary disease)  H/O appendicitis    FAMILY HISTORY:    SOCIAL HISTORY:    REVIEW OF SYSTEMS:  she has multiple issues    Vital Signs Last 24 Hrs  T(C): 36.4, Max: 37.6 (01-18 @ 05:30)  T(F): 97.6, Max: 99.6 (01-18 @ 05:30)  HR: 69 (67 - 75)  BP: 121/67 (121/67 - 167/78)  BP(mean): --  RR: 16 (16 - 17)  SpO2: 100% (99% - 100%)    I&O's Detail    PHYSICAL EXAM:  in bed, chest tightness on the right, "I have to catch my breath"  Malnourished, comfortable, - acute distress; vital signs are monitored  Eyes: PERRLA, EOMI, -conjunctivitis, -scleritis   Head: no focal deficit, normocephalic,  no trauma  ENMT: moist tongue, no thrush, -nasal discharge, -hoarseness, normal hearing, -cough, -hemoptysis, trachea midline  Neck: supple, - lymphadenopathy,  -masses, -JVD  Respiratory: bilateral diminished breath sounds, -wheezing, +rhonchi, -rales, -crackles  Chest: -accessory muscle use, -paradoxical breathing  Cardiovascular: regular rate and sinus rhythm, S1 S2 normal, -S3, -S4, -murmur, -gallop, -rub  Gastrointestinal: soft, nontender, nondistended, normal bowel sounds, no hepatosplenomegaly  Genitourinary: -flank pain, -dysuria  Extremities: -clubbing, -cyanosis, -edema    Vascular: peripheral pulses palpable 2+ bilaterally  Neurological: alert, oriented x 3, no focal deficit, -tremor   Skin: warm, dry, -erythema, iv site removed  Lymph nodes; no cervical, supraclavicular or axillary adenopathy  Psychiatric: agitated, decreased adherence    MEDICATIONS  (STANDING):  aspirin enteric coated 81milliGRAM(s) Oral daily  metoprolol succinate ER 25milliGRAM(s) Oral daily  heparin  Injectable 5000Unit(s) SubCutaneous every 8 hours  multivitamin 1Tablet(s) Oral daily  insulin lispro (HumaLOG) corrective regimen sliding scale  SubCutaneous Before meals and at bedtime  ALBUTerol/ipratropium for Nebulization 3milliLiter(s) Nebulizer every 6 hours  polyethylene glycol 3350 17Gram(s) Oral daily  docusate sodium 100milliGRAM(s) Oral two times a day  ferrous    sulfate 325milliGRAM(s) Oral three times a day with meals  oxyCODONE IR 30milliGRAM(s) Oral once    MEDICATIONS  (PRN):  acetaminophen   Tablet. 650milliGRAM(s) Oral every 6 hours PRN Moderate Pain (4 - 6)  acetaminophen/butalbital/caffeine 1Tablet(s) Oral two times a day PRN Headache  ipratropium 17 MICROgram(s) HFA Inhaler 1Puff(s) Inhalation every 6 hours PRN SOB  oxyCODONE IR 30milliGRAM(s) Oral every 4 hours PRN Severe Pain (7 - 10)    Allergies  apple (Unknown)  penicillin (Unknown)  Intolerances    LABS:    erratic results are difficult to explain  she refuses additional blood work    +DVT prophylaxis  +Sleep  -"slept soundly"  +Nutrition goals  +Pain  -Decubital ulcer  +GI prophylaxis (PPV, coagulopathy, Hx)  +Aspiration prophylaxis (45 degrees)    +Sedation/analgesia stopped  +ID (phos, CH, mupi, SB)  -Delirium    +Cardiac Beta/ACEI-ARB/ASA/statin  +Prevention  +Education  +Medication reviewed (drug-drug interactions, PDA)  Medical devices    Discussed with PGY, RN    RADIOLOGY & ADDITIONAL STUDIES:    no new imaging (refused) INTERVAL HPI/OVERNIGHT EVENTS:    Still refusing CT.    She was advised about lung changes and the use of a bronchoscopic lung biopsy in the past.    ADVISED ABOUT THE USE OF CT WITHOUT CONTRAST.    PAST MEDICAL & SURGICAL HISTORY:  CAD (coronary artery disease)  Arthritis  COPD (chronic obstructive pulmonary disease)  H/O appendicitis    FAMILY HISTORY:    SOCIAL HISTORY:    REVIEW OF SYSTEMS:  she has multiple issues    Vital Signs Last 24 Hrs  T(C): 36.4, Max: 37.6 (01-18 @ 05:30)  T(F): 97.6, Max: 99.6 (01-18 @ 05:30)  HR: 69 (67 - 75)  BP: 121/67 (121/67 - 167/78)  BP(mean): --  RR: 16 (16 - 17)  SpO2: 100% (99% - 100%)    I&O's Detail    PHYSICAL EXAM:  in bed, chest tightness on the right, "I have to catch my breath"  Malnourished, comfortable, - acute distress; vital signs are monitored  Eyes: PERRLA, EOMI, -conjunctivitis, -scleritis   Head: no focal deficit, normocephalic,  no trauma  ENMT: moist tongue, no thrush, -nasal discharge, -hoarseness, normal hearing, -cough, -hemoptysis, trachea midline  Neck: supple, - lymphadenopathy,  -masses, -JVD  Respiratory: bilateral diminished breath sounds, -wheezing, +rhonchi, -rales, -crackles  Chest: -accessory muscle use, -paradoxical breathing  Cardiovascular: regular rate and sinus rhythm, S1 S2 normal, -S3, -S4, -murmur, -gallop, -rub  Gastrointestinal: soft, nontender, nondistended, normal bowel sounds, no hepatosplenomegaly  Genitourinary: -flank pain, -dysuria  Extremities: -clubbing, -cyanosis, -edema    Vascular: peripheral pulses palpable 2+ bilaterally  Neurological: alert, oriented x 3, no focal deficit, -tremor   Skin: warm, dry, -erythema, iv site removed  Lymph nodes; no cervical, supraclavicular or axillary adenopathy  Psychiatric: agitated, decreased adherence    MEDICATIONS  (STANDING):  aspirin enteric coated 81milliGRAM(s) Oral daily  metoprolol succinate ER 25milliGRAM(s) Oral daily  heparin  Injectable 5000Unit(s) SubCutaneous every 8 hours  multivitamin 1Tablet(s) Oral daily  insulin lispro (HumaLOG) corrective regimen sliding scale  SubCutaneous Before meals and at bedtime  ALBUTerol/ipratropium for Nebulization 3milliLiter(s) Nebulizer every 6 hours  polyethylene glycol 3350 17Gram(s) Oral daily  docusate sodium 100milliGRAM(s) Oral two times a day  ferrous    sulfate 325milliGRAM(s) Oral three times a day with meals  oxyCODONE IR 30milliGRAM(s) Oral once    MEDICATIONS  (PRN):  acetaminophen   Tablet. 650milliGRAM(s) Oral every 6 hours PRN Moderate Pain (4 - 6)  acetaminophen/butalbital/caffeine 1Tablet(s) Oral two times a day PRN Headache  ipratropium 17 MICROgram(s) HFA Inhaler 1Puff(s) Inhalation every 6 hours PRN SOB  oxyCODONE IR 30milliGRAM(s) Oral every 4 hours PRN Severe Pain (7 - 10)    Allergies  apple (Unknown)  penicillin (Unknown)  Intolerances    LABS:    erratic results are difficult to explain  she refuses additional blood work    +DVT prophylaxis  +Sleep  -"slept soundly"  +Nutrition goals  +Pain  -Decubital ulcer  +GI prophylaxis (PPV, coagulopathy, Hx)  +Aspiration prophylaxis (45 degrees)    +Sedation/analgesia stopped  +ID (phos, CH, mupi, SB)  -Delirium    +Cardiac Beta/ACEI-ARB/ASA/statin  +Prevention  +Education  +Medication reviewed (drug-drug interactions, PDA)  Medical devices    Discussed with PGY, RN    RADIOLOGY & ADDITIONAL STUDIES:    no new imaging (refused)

## 2017-01-18 NOTE — PROGRESS NOTE ADULT - PROBLEM SELECTOR PROBLEM 1
COPD exacerbation
Dyspnea, unspecified type

## 2017-01-18 NOTE — DISCHARGE NOTE ADULT - MEDICATION SUMMARY - MEDICATIONS TO CHANGE
I will SWITCH the dose or number of times a day I take the medications listed below when I get home from the hospital:    oxyCODONE 30 mg oral tablet, extended release  --  by mouth    oxyCODONE 15 mg oral tablet  --  by mouth

## 2017-01-18 NOTE — DISCHARGE NOTE ADULT - MEDICATION SUMMARY - MEDICATIONS TO STOP TAKING
I will STOP taking the medications listed below when I get home from the hospital:    Percocet 5/325 325 mg-5 mg oral tablet  -- 1 tab(s) by mouth every 6 hours MDD:8  -- Caution federal law prohibits the transfer of this drug to any person other  than the person for whom it was prescribed.  May cause drowsiness.  Alcohol may intensify this effect.  Use care when operating dangerous machinery.  This prescription cannot be refilled.  This product contains acetaminophen.  Do not use  with any other product containing acetaminophen to prevent possible liver damage.  Using more of this medication than prescribed may cause serious breathing problems.

## 2017-01-18 NOTE — PROGRESS NOTE ADULT - PROBLEM SELECTOR PLAN 9
She wants home oxygen but will probably not need it. F/u room air SO2.

## 2017-01-18 NOTE — PROGRESS NOTE ADULT - PROBLEM SELECTOR PROBLEM 2
Arthritis
Arthritis
Sleep disorder
UTI (urinary tract infection)
Sleep disorder

## 2017-01-18 NOTE — DISCHARGE NOTE ADULT - CARE PROVIDER_API CALL
Harvey Ng), Internal Medicine; Nephrology  [address], [place] [address]  Phone: [phone]  Fax: [phone]

## 2017-01-18 NOTE — PROGRESS NOTE ADULT - ASSESSMENT
elderly female with multiple somatic complaints  drug seeking behavior  COPD  contracted, cachexic, kyphotic  noncompliant  seen by pain management    discharge planning  social service  supportive care

## 2017-01-18 NOTE — DISCHARGE NOTE ADULT - MEDICATION SUMMARY - MEDICATIONS TO TAKE
I will START or STAY ON the medications listed below when I get home from the hospital:    Tylenol 325 mg oral capsule  --  by mouth   -- Indication: For Moderate pain    Aspir 81 81 mg oral delayed release tablet  -- 1 tab(s) by mouth once a day  -- Swallow whole.  Do not crush.  Take with food or milk.    -- Indication: For Heart attack and stroke prevention    oxyCODONE 10 mg oral tablet  -- 3 tab(s) by mouth every 3 hours, As Needed for severe pain  -- Indication: For Severe pain    Dilt- mg/24 hours oral capsule, extended release  -- 1 cap(s) by mouth once a day  -- It is very important that you take or use this exactly as directed.  Do not skip doses or discontinue unless directed by your doctor.  Some non-prescription drugs may aggravate your condition.  Read all labels carefully.  If a warning appears, check with your doctor before taking.  Swallow whole.  Do not crush.    -- Indication: For Atrial fibrillation    Metoprolol Tartrate 25 mg oral tablet  -- 1 tab(s) by mouth once a day  -- It is very important that you take or use this exactly as directed.  Do not skip doses or discontinue unless directed by your doctor.  May cause drowsiness.  Alcohol may intensify this effect.  Use care when operating dangerous machinery.  Some non-prescription drugs may aggravate your condition.  Read all labels carefully.  If a warning appears, check with your doctor before taking.  Take with food or milk.  This drug may impair the ability to drive or operate machinery.  Use care until you become familiar with its effects.    -- Indication: For Heart health and atrial fibrillation    tiotropium  --  inhaled   -- Indication: For COPD     albuterol  --  by mouth   -- Indication: For COPD       Proventil HFA CFC free 90 mcg/inh inhalation aerosol  -- 2 puff(s) inhaled 4 times a day  -- For inhalation only.  It is very important that you take or use this exactly as directed.  Do not skip doses or discontinue unless directed by your doctor.  Obtain medical advice before taking any non-prescription drugs as some may affect the action of this medication.  Shake well before use.    -- Indication: For COPD       ferrous sulfate 325 mg (65 mg elemental iron) oral tablet  -- 1 tab(s) by mouth 3 times a day (with meals)  -- Check with your doctor before becoming pregnant.  Do not chew, break, or crush.  May discolor urine or feces.    -- Indication: For Iron deficiency anemia    docusate sodium 100 mg oral capsule  -- 1 cap(s) by mouth 2 times a day  -- Indication: For Stool softener    Flovent HFA CFC free 44 mcg/inh inhalation aerosol  -- 2 puff(s) inhaled 2 times a day  -- Check with your doctor before becoming pregnant.  For inhalation only.  Rinse mouth thoroughly after use.  Shake well before use.    -- Indication: For COPD       fluticasone  --  inhaled   -- Indication: For COPD

## 2017-01-18 NOTE — PROGRESS NOTE ADULT - PROVIDER SPECIALTY LIST ADULT
Internal Medicine
Pulmonology
Internal Medicine

## 2017-01-18 NOTE — PHYSICAL THERAPY INITIAL EVALUATION ADULT - CRITERIA FOR SKILLED THERAPEUTIC INTERVENTIONS
anticipated discharge recommendation/risk reduction/prevention/functional limitations in following categories/rehab potential/impairments found/therapy frequency

## 2017-01-18 NOTE — PROGRESS NOTE ADULT - PROBLEM SELECTOR PROBLEM 7
Chronic obstructive pulmonary disease, unspecified COPD type
Prophylactic measure
Chronic obstructive pulmonary disease, unspecified COPD type

## 2017-01-18 NOTE — PROGRESS NOTE ADULT - PROBLEM SELECTOR PROBLEM 4
OLEGARIO (acute kidney injury)
CAD (coronary artery disease)
CAD (coronary artery disease)
OLEGARIO (acute kidney injury)

## 2017-01-18 NOTE — PHYSICAL THERAPY INITIAL EVALUATION ADULT - PERTINENT HX OF CURRENT PROBLEM, REHAB EVAL
71 year old female presenting to the ED c.o SOB. Patient reports feeling short of breath this morning when waking up and sitting herself up from bed. Denies any fever, chill n/v/d. Denies any recent illness or sick contacts. Patient was recently discharged from [facility] 2 days ago after a 10 day hospital admission.

## 2017-01-18 NOTE — PROGRESS NOTE ADULT - SUBJECTIVE AND OBJECTIVE BOX
71 year old female who went to ER c/o SOB, SWEET and failure to thrive. Frequent visits to various emergency rooms. Sees pain management physician for complaints of chronic pain. Initial CBC in ER showed low HCT but repeat was normal. Bacteriuria on urinalysis.    Generally uncooperative. Refusing PT, CT. Keeps asking for more pain meds    PAST MEDICAL & SURGICAL HISTORY:  CAD (coronary artery disease)  Arthritis  COPD (chronic obstructive pulmonary disease)  H/O appendicitis  F PMH COPD (not on home O2) c/b multiple hospitalizations for exacerbations/intubations, chronic pain, MI, admitted 1/14 for SOB x 1 day likely 2/2 COPD exacerbation.    Allergies:  penicillin (Unknown)    MEDICATIONS  (STANDING):  aspirin enteric coated 81milliGRAM(s) Oral daily  metoprolol succinate ER 25milliGRAM(s) Oral daily  heparin  Injectable 5000Unit(s) SubCutaneous every 8 hours  multivitamin 1Tablet(s) Oral daily  insulin lispro (HumaLOG) corrective regimen sliding scale  SubCutaneous Before meals and at bedtime  ALBUTerol/ipratropium for Nebulization 3milliLiter(s) Nebulizer every 6 hours  polyethylene glycol 3350 17Gram(s) Oral daily  docusate sodium 100milliGRAM(s) Oral two times a day  ferrous    sulfate 325milliGRAM(s) Oral three times a day with meals  predniSONE   Tablet 20milliGRAM(s) Oral daily    MEDICATIONS  (PRN):  acetaminophen   Tablet. 650milliGRAM(s) Oral every 6 hours PRN Moderate Pain (4 - 6)  oxyCODONE IR 30milliGRAM(s) Oral every 3 hours PRN Severe Pain (7 - 10)  acetaminophen/butalbital/caffeine 1Tablet(s) Oral two times a day PRN Headache  ipratropium 17 MICROgram(s) HFA Inhaler 1Puff(s) Inhalation every 6 hours PRN SOB      Vital Signs Last 24 Hrs  T(C): 36.9, Max: 37.6 (01-18 @ 05:30)  T(F): 98.4, Max: 99.6 (01-18 @ 05:30)  HR: 67 (67 - 86)  BP: 132/67 (130/71 - 167/78)  BP(mean): --  RR: 16 (16 - 17)  SpO2: 100% (99% - 100%)  General: kyphotic, cachectic elder female, poor historian, tangential  HEENT: no JVD  Respiratory: diminished breath sounds b/l, scattered wheezes  Cardiovascular: S1, S2, no rubs or murmurs  Abdomen: Soft, normoactive BS, no masses or tenderness.  Extremities: contracted, no edema               no new labs

## 2017-01-18 NOTE — DISCHARGE NOTE ADULT - PATIENT PORTAL LINK FT
“You can access the [employer] Patient Portal, offered by [facility], by registering with the following website: [email or web address]”

## 2017-01-18 NOTE — PROGRESS NOTE ADULT - PROBLEM SELECTOR PROBLEM 6
Coronary artery disease involving native heart with angina pectoris, unspecified vessel or lesion type
Anemia
Coronary artery disease involving native heart with angina pectoris, unspecified vessel or lesion type
Coronary artery disease involving native heart with angina pectoris, unspecified vessel or lesion type
Prophylactic measure
Prophylactic measure
Coronary artery disease involving native heart with angina pectoris, unspecified vessel or lesion type

## 2017-01-19 LAB
CULTURE RESULTS: SIGNIFICANT CHANGE UP
CULTURE RESULTS: SIGNIFICANT CHANGE UP
SPECIMEN SOURCE: SIGNIFICANT CHANGE UP
SPECIMEN SOURCE: SIGNIFICANT CHANGE UP

## 2017-01-19 NOTE — CONSULT NOTE ADULT - SUBJECTIVE AND OBJECTIVE BOX
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 Following admission patient was treated for exacerbation of COPD. Patient has history of Chronic Pain Disorder and was continued on Oxycodone 30mg po q 3 hours. Patient reports she lives with her alcoholic boyfriend in her apartment but she is increasingly unable to care for herself. She says now her breathing has improved and she is requesting discharge.    Mental Status: Patient is a slender appearing 71 year old female who is alert and oriented to person, place, and time and sitting up in bed. Speech is clear. Patient talks excessively and needs to be interupted to be redirected. Affect is full range. Mood is anxious.  Thought processes are linear. There are no auditory or visual hallucinations. there is no suicidal or homicidal ideation. Insight is limited Judgement is fair.    Impression:  Adjustment Disorder                     Chronic Pain Syndrome.    Recommend: Continue present treatment; would benefit from an Assisted Living Residence. Will discuss.

## 2017-01-23 DIAGNOSIS — M19.90 UNSPECIFIED OSTEOARTHRITIS, UNSPECIFIED SITE: ICD-10-CM

## 2017-01-23 DIAGNOSIS — D64.9 ANEMIA, UNSPECIFIED: ICD-10-CM

## 2017-01-23 DIAGNOSIS — G47.9 SLEEP DISORDER, UNSPECIFIED: ICD-10-CM

## 2017-01-23 DIAGNOSIS — Z79.891 LONG TERM (CURRENT) USE OF OPIATE ANALGESIC: ICD-10-CM

## 2017-01-23 DIAGNOSIS — R09.02 HYPOXEMIA: ICD-10-CM

## 2017-01-23 DIAGNOSIS — Z91.19 PATIENT'S NONCOMPLIANCE WITH OTHER MEDICAL TREATMENT AND REGIMEN: ICD-10-CM

## 2017-01-23 DIAGNOSIS — I25.10 ATHEROSCLEROTIC HEART DISEASE OF NATIVE CORONARY ARTERY WITHOUT ANGINA PECTORIS: ICD-10-CM

## 2017-01-23 DIAGNOSIS — J44.1 CHRONIC OBSTRUCTIVE PULMONARY DISEASE WITH (ACUTE) EXACERBATION: ICD-10-CM

## 2017-01-23 DIAGNOSIS — G89.29 OTHER CHRONIC PAIN: ICD-10-CM

## 2017-01-23 DIAGNOSIS — R64 CACHEXIA: ICD-10-CM

## 2017-01-23 DIAGNOSIS — N39.0 URINARY TRACT INFECTION, SITE NOT SPECIFIED: ICD-10-CM

## 2017-01-23 DIAGNOSIS — M54.30 SCIATICA, UNSPECIFIED SIDE: ICD-10-CM

## 2017-01-23 DIAGNOSIS — R06.02 SHORTNESS OF BREATH: ICD-10-CM

## 2017-01-23 DIAGNOSIS — N17.9 ACUTE KIDNEY FAILURE, UNSPECIFIED: ICD-10-CM

## 2017-03-05 ENCOUNTER — EMERGENCY (EMERGENCY)
Facility: HOSPITAL | Age: 72
LOS: 1 days | Discharge: HOME | End: 2017-03-05
Attending: EMERGENCY MEDICINE | Admitting: EMERGENCY MEDICINE
Payer: MEDICARE

## 2017-03-05 VITALS
HEART RATE: 80 BPM | SYSTOLIC BLOOD PRESSURE: 168 MMHG | OXYGEN SATURATION: 99 % | RESPIRATION RATE: 18 BRPM | TEMPERATURE: 98 F | DIASTOLIC BLOOD PRESSURE: 84 MMHG

## 2017-03-05 VITALS
HEART RATE: 85 BPM | OXYGEN SATURATION: 95 % | RESPIRATION RATE: 20 BRPM | DIASTOLIC BLOOD PRESSURE: 65 MMHG | SYSTOLIC BLOOD PRESSURE: 119 MMHG | TEMPERATURE: 98 F

## 2017-03-05 DIAGNOSIS — Z79.899 OTHER LONG TERM (CURRENT) DRUG THERAPY: ICD-10-CM

## 2017-03-05 DIAGNOSIS — M54.9 DORSALGIA, UNSPECIFIED: ICD-10-CM

## 2017-03-05 DIAGNOSIS — M54.6 PAIN IN THORACIC SPINE: ICD-10-CM

## 2017-03-05 DIAGNOSIS — I25.10 ATHEROSCLEROTIC HEART DISEASE OF NATIVE CORONARY ARTERY WITHOUT ANGINA PECTORIS: ICD-10-CM

## 2017-03-05 DIAGNOSIS — Z87.19 PERSONAL HISTORY OF OTHER DISEASES OF THE DIGESTIVE SYSTEM: Chronic | ICD-10-CM

## 2017-03-05 DIAGNOSIS — Z88.0 ALLERGY STATUS TO PENICILLIN: ICD-10-CM

## 2017-03-05 DIAGNOSIS — G89.29 OTHER CHRONIC PAIN: ICD-10-CM

## 2017-03-05 DIAGNOSIS — J44.9 CHRONIC OBSTRUCTIVE PULMONARY DISEASE, UNSPECIFIED: ICD-10-CM

## 2017-03-05 DIAGNOSIS — Z91.018 ALLERGY TO OTHER FOODS: ICD-10-CM

## 2017-03-05 DIAGNOSIS — Z79.82 LONG TERM (CURRENT) USE OF ASPIRIN: ICD-10-CM

## 2017-03-05 PROCEDURE — 72131 CT LUMBAR SPINE W/O DYE: CPT | Mod: 26

## 2017-03-05 PROCEDURE — 93005 ELECTROCARDIOGRAM TRACING: CPT

## 2017-03-05 PROCEDURE — 72131 CT LUMBAR SPINE W/O DYE: CPT

## 2017-03-05 PROCEDURE — 93010 ELECTROCARDIOGRAM REPORT: CPT

## 2017-03-05 PROCEDURE — 72128 CT CHEST SPINE W/O DYE: CPT | Mod: 26

## 2017-03-05 PROCEDURE — 73502 X-RAY EXAM HIP UNI 2-3 VIEWS: CPT | Mod: 26,RT

## 2017-03-05 PROCEDURE — 94640 AIRWAY INHALATION TREATMENT: CPT

## 2017-03-05 PROCEDURE — 99284 EMERGENCY DEPT VISIT MOD MDM: CPT | Mod: 25

## 2017-03-05 PROCEDURE — 72128 CT CHEST SPINE W/O DYE: CPT

## 2017-03-05 PROCEDURE — 73502 X-RAY EXAM HIP UNI 2-3 VIEWS: CPT

## 2017-03-05 RX ORDER — OXYCODONE HYDROCHLORIDE 5 MG/1
1 TABLET ORAL
Qty: 18 | Refills: 0 | OUTPATIENT
Start: 2017-03-05

## 2017-03-05 RX ORDER — IPRATROPIUM/ALBUTEROL SULFATE 18-103MCG
3 AEROSOL WITH ADAPTER (GRAM) INHALATION ONCE
Qty: 0 | Refills: 0 | Status: COMPLETED | OUTPATIENT
Start: 2017-03-05 | End: 2017-03-05

## 2017-03-05 RX ORDER — ACETAMINOPHEN 500 MG
975 TABLET ORAL ONCE
Qty: 0 | Refills: 0 | Status: DISCONTINUED | OUTPATIENT
Start: 2017-03-05 | End: 2017-03-05

## 2017-03-05 RX ORDER — IPRATROPIUM BROMIDE 0.2 MG/ML
2.5 SOLUTION, NON-ORAL INHALATION
Qty: 25 | Refills: 1 | OUTPATIENT
Start: 2017-03-05

## 2017-03-05 RX ORDER — OXYCODONE HYDROCHLORIDE 5 MG/1
30 TABLET ORAL ONCE
Qty: 0 | Refills: 0 | Status: DISCONTINUED | OUTPATIENT
Start: 2017-03-05 | End: 2017-03-05

## 2017-03-05 RX ADMIN — OXYCODONE HYDROCHLORIDE 30 MILLIGRAM(S): 5 TABLET ORAL at 19:30

## 2017-03-05 RX ADMIN — Medication 3 MILLILITER(S): at 19:49

## 2017-03-05 NOTE — CONSULT NOTE ADULT - SUBJECTIVE AND OBJECTIVE BOX
HISTORY OF PRESENT ILLNESS:    72 yo pmhx COPD, CAD with chronic back pain evaluated at several other facilities, most recently at [facility] for similar symptoms presents today with back pain. Pt states that the pain is worsening. Pt prescribed oxycodone 30mg by pain management which she has run out of. Pt reports recently falling at home. She has been unable to ambulate for years and is wheelchair bound. No urine incontinence, saddle anesthesia, numbness.     PAST MEDICAL & SURGICAL HISTORY:  CAD (coronary artery disease)  Arthritis  COPD (chronic obstructive pulmonary disease)  H/O appendicitis    FAMILY HISTORY:      SOCIAL HISTORY:  Tobacco Use: denies  EtOH use: denies  Substance: denies    Allergies    apple (Unknown)  penicillin (Unknown)    Intolerances        REVIEW OF SYSTEMS  General: denies fever, chills	  ENMT:Denies visual changes  Respiratory and Thorax: no current CP, SOB  Cardiovascular:	no CP  Gastrointestinal:	no n/v  Genitourinary: denies incontinence	  Musculoskeletal: endorses mid and lower back pain	  Neurological: denies numbness or tingling		      MEDICATIONS:  Antibiotics:    Neuro:    Anticoagulation:    OTHER:    IVF:      Vital Signs Last 24 Hrs  T(C): 36.6, Max: 36.6 (03-05 @ 12:13)  T(F): 97.8, Max: 97.8 (03-05 @ 12:13)  HR: 77 (77 - 80)  BP: 175/82 (168/84 - 175/82)  BP(mean): --  RR: 18 (18 - 18)  SpO2: 97% (97% - 99%)    PHYSICAL EXAM:  Constitutional: A&O x 3 no distress  Eyes: EOMI, PERRL  Back: mid thoracic and lumbar point tenderness, midline,. severe kyphosis deformity  Respiratory: patent, on supplemental NC  Neurological: JAMES x 4, b/l LE spastic, contracted, able to provide minimal HF b/l, KF 4/5 LLE, 2/5 RLE, Rt KF 3/5, Rt Df/PF 3/5, Rt RHL 2/5 o/w remainder of motor exam 5/5   b/l sensation intact    LABS:              CULTURES:      RADIOLOGY & ADDITIONAL STUDIES: Mild compression deformities at L2 and L5, age indeterminate. Compression deformity of T8 vertebral body, age indeterminateCompression deformity of T7 vertebral body, age indeterminate

## 2017-03-05 NOTE — CONSULT NOTE ADULT - ASSESSMENT
a/p 70 y/o female with severe COPD (past intubations) asthma, HTN, CAD with chronic LBP and compression fractures that would require complex surgical approach and patient is refusing any form of surgery.  -no acute neurosurgical intervention  -recommend pain management, Dr. Chow as outpatient; patient is amendable to potential injections and conservative management after lengthy discussion describing risks and benefits.   -physical therapy as outpatient  -patient can follow up in neurosurgery clinic [phone]  -case and images reviewed and d/w Dr. Alcantara

## 2017-03-05 NOTE — ED PROVIDER NOTE - MEDICAL DECISION MAKING DETAILS
Pt w/ lower back pain, chroinic in nature, no signs of cord compresion. Given point tenderness, CTs obtained, neurosurg consulted. Case mgt involved, pt with thorough outpt visiting nurses/PT for rehab purposes at home, no benefit of admission at this time. Pt w/ lower back pain, chroinic in nature, no signs of cord compresion. Given point tenderness, CTs obtained, neurosurg consulted, no acute intervention. Case mgt involved, pt with thorough outpt visiting nurses/PT for rehab purposes at home, no benefit of admission at this time. Pain meds sent to pharmacy for few days as prescribed by inpt pain mgt until pt can f/u with pain mgt as outpt, given also here prior to DC. Ipratropium vials also sent to pharmacy as requested by pt. To f/u with pcp.

## 2017-03-05 NOTE — ED ADULT NURSE NOTE - CHPI ED SYMPTOMS NEG
no motor function loss/no constipation/no numbness/no bowel dysfunction/no fatigue/no bladder dysfunction/no tingling

## 2017-03-05 NOTE — ED ADULT NURSE NOTE - CHIEF COMPLAINT QUOTE
pt c/o chronic lower back pain. pt states " Nothing is being done & its getting worse."  pt was recently seen at [facility] & had x-rays done & given refill of oxycodone.

## 2017-03-05 NOTE — ED ADULT NURSE REASSESSMENT NOTE - NS ED NURSE REASSESS COMMENT FT1
pt. is alert, awake, breathing unlabored, no distress, pt. is waiting for transportation home, no c/o pain, will monitor.

## 2017-03-05 NOTE — ED PROVIDER NOTE - PHYSICAL EXAMINATION
CONSTITUTIONAL: Well appearing, well nourished, awake, alert and in no apparent distress. Slightly distressed appearing.  ENMT: Airway patent.  HEAD: Head is atraumatic. Head shape is symmetrical.  EYES: Clear bilaterally. Normal EOMI.  CARDIAC: Normal rate, regular rhythm.  Heart sounds S1, S2.   RESPIRATORY: Breath sounds clear and equal bilaterally.  GASTROINTESTINAL: Abdomen soft, non-tender, no guarding.  MUSCULOSKELETAL: Spine appears normal, range of motion is not limited, no muscle or joint tenderness.   NEUROLOGICAL: Alert and oriented, no focal deficits, no motor or sensory deficits.  SKIN: Skin normal color for race, warm, dry and intact. No evidence of rash.  PSYCHIATRIC: Alert and oriented to person, place, time/situation. normal mood and affect. no apparent risk to self or others. CONSTITUTIONAL: Well appearing, well nourished, awake, alert and in no apparent distress. Slightly distressed appearing.  ENMT: Airway patent.  HEAD: Head is atraumatic. Head shape is symmetrical.  EYES: Clear bilaterally. Normal EOMI.  CARDIAC: Normal rate, regular rhythm.  Heart sounds S1, S2.   RESPIRATORY: Breath sounds clear and equal bilaterally.  GASTROINTESTINAL: Abdomen soft, non-tender, no guarding.  MUSCULOSKELETAL: kyphotic appearing spine, no muscle or joint tenderness. Point tender to mid thoracic and mid lumbar region. No bony tenderness to cervical or sacral region. R hip lateral bony tenderness.   NEUROLOGICAL: Alert and oriented, no focal deficits, no motor or sensory deficits.  SKIN: Skin normal color for race, warm, dry and intact. No evidence of rash.  PSYCHIATRIC: Alert and oriented to person, place, time/situation. normal mood and affect. no apparent risk to self or others.

## 2017-03-05 NOTE — ED ADULT TRIAGE NOTE - CHIEF COMPLAINT QUOTE
pt c/o chronic lower back pain. pt states " Nothing is being done & its getting worse."  pt was recently seen at [facility] & had x-rays done & given refill of oxycodone.

## 2017-03-05 NOTE — ED PROVIDER NOTE - OBJECTIVE STATEMENT
70 yo presenting with back pain ongoing for two years, states that she is upset at the situation; does not have pain medications at home, recently discharged but home NP stated she could not refill it them (prior documentation showing oxy 30mg q4hrs). No urine incontinence, saddle anesthesia, numbness. Staying at friend's place due to home bathroom filling w water. No acute abd pain, n/v, shob from baseline, cp. 70 yo presenting with back pain ongoing for two years, states that she is upset at the situation; does not have pain medications at home, recently discharged but home NP stated she could not refill it them (prior documentation showing oxy 30mg). No urine incontinence, saddle anesthesia, numbness. Staying at friend's place due to home bathroom filling w water. No acute abd pain, n/v, shob from baseline, cp.

## 2017-03-17 ENCOUNTER — EMERGENCY (EMERGENCY)
Facility: HOSPITAL | Age: 72
LOS: 1 days | Discharge: HOME | End: 2017-03-17
Attending: EMERGENCY MEDICINE | Admitting: EMERGENCY MEDICINE
Payer: MEDICARE

## 2017-03-17 VITALS
TEMPERATURE: 98 F | HEART RATE: 82 BPM | OXYGEN SATURATION: 97 % | SYSTOLIC BLOOD PRESSURE: 179 MMHG | RESPIRATION RATE: 18 BRPM | DIASTOLIC BLOOD PRESSURE: 78 MMHG

## 2017-03-17 VITALS
HEART RATE: 85 BPM | DIASTOLIC BLOOD PRESSURE: 78 MMHG | TEMPERATURE: 98 F | OXYGEN SATURATION: 100 % | SYSTOLIC BLOOD PRESSURE: 170 MMHG | RESPIRATION RATE: 18 BRPM

## 2017-03-17 DIAGNOSIS — M54.5 LOW BACK PAIN: ICD-10-CM

## 2017-03-17 DIAGNOSIS — Z79.899 OTHER LONG TERM (CURRENT) DRUG THERAPY: ICD-10-CM

## 2017-03-17 DIAGNOSIS — Z87.19 PERSONAL HISTORY OF OTHER DISEASES OF THE DIGESTIVE SYSTEM: Chronic | ICD-10-CM

## 2017-03-17 DIAGNOSIS — Z88.0 ALLERGY STATUS TO PENICILLIN: ICD-10-CM

## 2017-03-17 DIAGNOSIS — Z79.891 LONG TERM (CURRENT) USE OF OPIATE ANALGESIC: ICD-10-CM

## 2017-03-17 DIAGNOSIS — G89.29 OTHER CHRONIC PAIN: ICD-10-CM

## 2017-03-17 DIAGNOSIS — Z87.891 PERSONAL HISTORY OF NICOTINE DEPENDENCE: ICD-10-CM

## 2017-03-17 DIAGNOSIS — Z79.82 LONG TERM (CURRENT) USE OF ASPIRIN: ICD-10-CM

## 2017-03-17 DIAGNOSIS — Z91.018 ALLERGY TO OTHER FOODS: ICD-10-CM

## 2017-03-17 PROCEDURE — 99283 EMERGENCY DEPT VISIT LOW MDM: CPT | Mod: 25

## 2017-03-17 PROCEDURE — 99284 EMERGENCY DEPT VISIT MOD MDM: CPT | Mod: 25

## 2017-03-17 PROCEDURE — 93010 ELECTROCARDIOGRAM REPORT: CPT

## 2017-03-17 PROCEDURE — 93005 ELECTROCARDIOGRAM TRACING: CPT

## 2017-03-17 RX ORDER — OXYCODONE HYDROCHLORIDE 5 MG/1
10 TABLET ORAL ONCE
Qty: 0 | Refills: 0 | Status: DISCONTINUED | OUTPATIENT
Start: 2017-03-17 | End: 2017-03-17

## 2017-03-17 RX ADMIN — OXYCODONE HYDROCHLORIDE 10 MILLIGRAM(S): 5 TABLET ORAL at 16:45

## 2017-03-17 NOTE — ED ADULT NURSE NOTE - OBJECTIVE STATEMENT
71 yr old female patient with c/o of back pain and rib pain x1week.  Denies trauma or falls.  Pt states she cannot walk because of the pain.  Patient breathing easily and unlabored.  A+OX3.

## 2017-03-17 NOTE — ED ADULT TRIAGE NOTE - CHIEF COMPLAINT QUOTE
Pt BIBA from home for back pain. Pt also C/O bilateral rib pain. Pt denies SOB, dizziness, fever, chills. EKG in progress.

## 2017-03-17 NOTE — ED PROVIDER NOTE - OBJECTIVE STATEMENT
71 F w LBP- chronic LBP no change in pain- requesting narcotic pain meds  no loss of bowel bladder- pain worse with movement  no new trauma  exac movement  allev pain meds  no radiation/tingling/numbness

## 2017-03-17 NOTE — ED PROVIDER NOTE - MEDICAL DECISION MAKING DETAILS
px seen by pain mgmt this week- no narcs given- and by visiting np- no narcs given- extensive d/w px- I am not comfortable rx'ing narcs- agreed to 1 dose in the ED

## 2017-03-30 ENCOUNTER — INPATIENT (INPATIENT)
Facility: HOSPITAL | Age: 72
LOS: 0 days | Discharge: LEFT BEFORE DISCHARGE | DRG: 546 | End: 2017-03-31
Attending: INTERNAL MEDICINE | Admitting: INTERNAL MEDICINE
Payer: MEDICARE

## 2017-03-30 VITALS
HEART RATE: 123 BPM | RESPIRATION RATE: 26 BRPM | SYSTOLIC BLOOD PRESSURE: 149 MMHG | WEIGHT: 91.93 LBS | OXYGEN SATURATION: 96 % | HEIGHT: 63 IN | TEMPERATURE: 101 F | DIASTOLIC BLOOD PRESSURE: 91 MMHG

## 2017-03-30 VITALS
TEMPERATURE: 98 F | HEART RATE: 92 BPM | RESPIRATION RATE: 17 BRPM | OXYGEN SATURATION: 98 % | SYSTOLIC BLOOD PRESSURE: 143 MMHG | DIASTOLIC BLOOD PRESSURE: 68 MMHG

## 2017-03-30 DIAGNOSIS — J44.1 CHRONIC OBSTRUCTIVE PULMONARY DISEASE WITH (ACUTE) EXACERBATION: ICD-10-CM

## 2017-03-30 DIAGNOSIS — R63.6 UNDERWEIGHT: ICD-10-CM

## 2017-03-30 DIAGNOSIS — I48.91 UNSPECIFIED ATRIAL FIBRILLATION: ICD-10-CM

## 2017-03-30 DIAGNOSIS — R63.8 OTHER SYMPTOMS AND SIGNS CONCERNING FOOD AND FLUID INTAKE: ICD-10-CM

## 2017-03-30 DIAGNOSIS — T40.2X5A ADVERSE EFFECT OF OTHER OPIOIDS, INITIAL ENCOUNTER: ICD-10-CM

## 2017-03-30 DIAGNOSIS — Z87.19 PERSONAL HISTORY OF OTHER DISEASES OF THE DIGESTIVE SYSTEM: Chronic | ICD-10-CM

## 2017-03-30 DIAGNOSIS — M54.9 DORSALGIA, UNSPECIFIED: ICD-10-CM

## 2017-03-30 DIAGNOSIS — R63.0 ANOREXIA: ICD-10-CM

## 2017-03-30 DIAGNOSIS — Z41.8 ENCOUNTER FOR OTHER PROCEDURES FOR PURPOSES OTHER THAN REMEDYING HEALTH STATE: ICD-10-CM

## 2017-03-30 DIAGNOSIS — R50.9 FEVER, UNSPECIFIED: ICD-10-CM

## 2017-03-30 LAB
ALBUMIN SERPL ELPH-MCNC: 2.5 G/DL — LOW (ref 3.4–5)
ALBUMIN SERPL ELPH-MCNC: 2.7 G/DL — LOW (ref 3.4–5)
ALBUMIN SERPL ELPH-MCNC: 3.1 G/DL — LOW (ref 3.4–5)
ALP SERPL-CCNC: 181 U/L — HIGH (ref 40–120)
ALP SERPL-CCNC: 191 U/L — HIGH (ref 40–120)
ALP SERPL-CCNC: 243 U/L — HIGH (ref 40–120)
ALT FLD-CCNC: 33 U/L — SIGNIFICANT CHANGE UP (ref 12–42)
ALT FLD-CCNC: 41 U/L — SIGNIFICANT CHANGE UP (ref 12–42)
ALT FLD-CCNC: 47 U/L — HIGH (ref 12–42)
ANION GAP SERPL CALC-SCNC: 7 MMOL/L — LOW (ref 9–16)
ANION GAP SERPL CALC-SCNC: 9 MMOL/L — SIGNIFICANT CHANGE UP (ref 9–16)
ANION GAP SERPL CALC-SCNC: 9 MMOL/L — SIGNIFICANT CHANGE UP (ref 9–16)
APPEARANCE UR: CLEAR — SIGNIFICANT CHANGE UP
AST SERPL-CCNC: 24 U/L — SIGNIFICANT CHANGE UP (ref 15–37)
AST SERPL-CCNC: 31 U/L — SIGNIFICANT CHANGE UP (ref 15–37)
AST SERPL-CCNC: 55 U/L — HIGH (ref 15–37)
BILIRUB SERPL-MCNC: 0.2 MG/DL — SIGNIFICANT CHANGE UP (ref 0.2–1.2)
BILIRUB SERPL-MCNC: 0.2 MG/DL — SIGNIFICANT CHANGE UP (ref 0.2–1.2)
BILIRUB SERPL-MCNC: 0.3 MG/DL — SIGNIFICANT CHANGE UP (ref 0.2–1.2)
BILIRUB UR-MCNC: NEGATIVE — SIGNIFICANT CHANGE UP
BLD GP AB SCN SERPL QL: NEGATIVE — SIGNIFICANT CHANGE UP
BUN SERPL-MCNC: 29 MG/DL — HIGH (ref 7–23)
BUN SERPL-MCNC: 31 MG/DL — HIGH (ref 7–23)
BUN SERPL-MCNC: 35 MG/DL — HIGH (ref 7–23)
CALCIUM SERPL-MCNC: 8.1 MG/DL — LOW (ref 8.5–10.5)
CALCIUM SERPL-MCNC: 8.3 MG/DL — LOW (ref 8.5–10.5)
CALCIUM SERPL-MCNC: 8.8 MG/DL — SIGNIFICANT CHANGE UP (ref 8.5–10.5)
CHLORIDE SERPL-SCNC: 111 MMOL/L — HIGH (ref 96–108)
CHLORIDE SERPL-SCNC: 111 MMOL/L — HIGH (ref 96–108)
CHLORIDE SERPL-SCNC: 113 MMOL/L — HIGH (ref 96–108)
CO2 SERPL-SCNC: 20 MMOL/L — LOW (ref 22–31)
CO2 SERPL-SCNC: 21 MMOL/L — LOW (ref 22–31)
CO2 SERPL-SCNC: 23 MMOL/L — SIGNIFICANT CHANGE UP (ref 22–31)
COLOR SPEC: YELLOW — SIGNIFICANT CHANGE UP
CREAT SERPL-MCNC: 1.01 MG/DL — SIGNIFICANT CHANGE UP (ref 0.5–1.3)
CREAT SERPL-MCNC: 1.11 MG/DL — SIGNIFICANT CHANGE UP (ref 0.5–1.3)
CREAT SERPL-MCNC: 1.12 MG/DL — SIGNIFICANT CHANGE UP (ref 0.5–1.3)
D DIMER BLD IA.RAPID-MCNC: 329 NG/ML DDU — HIGH
DIFF PNL FLD: (no result)
GAS PNL BLDV: SIGNIFICANT CHANGE UP
GLUCOSE SERPL-MCNC: 122 MG/DL — HIGH (ref 70–99)
GLUCOSE SERPL-MCNC: 153 MG/DL — HIGH (ref 70–99)
GLUCOSE SERPL-MCNC: 230 MG/DL — HIGH (ref 70–99)
GLUCOSE UR QL: NEGATIVE — SIGNIFICANT CHANGE UP
HCT VFR BLD CALC: 25 % — LOW (ref 34.5–45)
HCT VFR BLD CALC: 25.3 % — LOW (ref 34.5–45)
HCT VFR BLD CALC: 28.6 % — LOW (ref 34.5–45)
HCV AB S/CO SERPL IA: 12.31 S/CO — SIGNIFICANT CHANGE UP
HCV AB SERPL-IMP: REACTIVE
HGB BLD-MCNC: 7.8 G/DL — LOW (ref 11.5–15.5)
HGB BLD-MCNC: 8 G/DL — LOW (ref 11.5–15.5)
HGB BLD-MCNC: 9.1 G/DL — LOW (ref 11.5–15.5)
INR BLD: 1.06 — SIGNIFICANT CHANGE UP (ref 0.88–1.16)
KETONES UR-MCNC: NEGATIVE — SIGNIFICANT CHANGE UP
LACTATE SERPL-SCNC: 1.2 MMOL/L — SIGNIFICANT CHANGE UP (ref 0.5–2)
LEUKOCYTE ESTERASE UR-ACNC: NEGATIVE — SIGNIFICANT CHANGE UP
MAGNESIUM SERPL-MCNC: 1.7 MG/DL — SIGNIFICANT CHANGE UP (ref 1.6–2.4)
MAGNESIUM SERPL-MCNC: 1.8 MG/DL — SIGNIFICANT CHANGE UP (ref 1.6–2.4)
MCHC RBC-ENTMCNC: 25.4 PG — LOW (ref 27–34)
MCHC RBC-ENTMCNC: 25.7 PG — LOW (ref 27–34)
MCHC RBC-ENTMCNC: 25.9 PG — LOW (ref 27–34)
MCHC RBC-ENTMCNC: 31.2 G/DL — LOW (ref 32–36)
MCHC RBC-ENTMCNC: 31.6 G/DL — LOW (ref 32–36)
MCHC RBC-ENTMCNC: 31.8 G/DL — LOW (ref 32–36)
MCV RBC AUTO: 80.8 FL — SIGNIFICANT CHANGE UP (ref 80–100)
MCV RBC AUTO: 81.4 FL — SIGNIFICANT CHANGE UP (ref 80–100)
MCV RBC AUTO: 81.9 FL — SIGNIFICANT CHANGE UP (ref 80–100)
NITRITE UR-MCNC: NEGATIVE — SIGNIFICANT CHANGE UP
PCP SPEC-MCNC: SIGNIFICANT CHANGE UP
PH UR: 6 — SIGNIFICANT CHANGE UP (ref 4–8)
PLATELET # BLD AUTO: 270 K/UL — SIGNIFICANT CHANGE UP (ref 150–400)
PLATELET # BLD AUTO: 286 K/UL — SIGNIFICANT CHANGE UP (ref 150–400)
PLATELET # BLD AUTO: 336 K/UL — SIGNIFICANT CHANGE UP (ref 150–400)
POTASSIUM SERPL-MCNC: 4.2 MMOL/L — SIGNIFICANT CHANGE UP (ref 3.5–5.3)
POTASSIUM SERPL-MCNC: 4.4 MMOL/L — SIGNIFICANT CHANGE UP (ref 3.5–5.3)
POTASSIUM SERPL-MCNC: 4.5 MMOL/L — SIGNIFICANT CHANGE UP (ref 3.5–5.3)
POTASSIUM SERPL-SCNC: 4.2 MMOL/L — SIGNIFICANT CHANGE UP (ref 3.5–5.3)
POTASSIUM SERPL-SCNC: 4.4 MMOL/L — SIGNIFICANT CHANGE UP (ref 3.5–5.3)
POTASSIUM SERPL-SCNC: 4.5 MMOL/L — SIGNIFICANT CHANGE UP (ref 3.5–5.3)
PROT SERPL-MCNC: 7.3 G/DL — SIGNIFICANT CHANGE UP (ref 6.4–8.2)
PROT SERPL-MCNC: 7.5 G/DL — SIGNIFICANT CHANGE UP (ref 6.4–8.2)
PROT SERPL-MCNC: 8.7 G/DL — HIGH (ref 6.4–8.2)
PROT UR-MCNC: 30 MG/DL
PROTHROM AB SERPL-ACNC: 11.8 SEC — SIGNIFICANT CHANGE UP (ref 9.8–12.7)
RAPID RVP RESULT: SIGNIFICANT CHANGE UP
RBC # BLD: 3.07 M/UL — LOW (ref 3.8–5.2)
RBC # BLD: 3.09 M/UL — LOW (ref 3.8–5.2)
RBC # BLD: 3.54 M/UL — LOW (ref 3.8–5.2)
RBC # FLD: 14.4 % — SIGNIFICANT CHANGE UP (ref 10.3–16.9)
RBC # FLD: 14.4 % — SIGNIFICANT CHANGE UP (ref 10.3–16.9)
RBC # FLD: 14.5 % — SIGNIFICANT CHANGE UP (ref 10.3–16.9)
RH IG SCN BLD-IMP: POSITIVE — SIGNIFICANT CHANGE UP
SODIUM SERPL-SCNC: 140 MMOL/L — SIGNIFICANT CHANGE UP (ref 135–145)
SODIUM SERPL-SCNC: 141 MMOL/L — SIGNIFICANT CHANGE UP (ref 135–145)
SODIUM SERPL-SCNC: 143 MMOL/L — SIGNIFICANT CHANGE UP (ref 135–145)
SP GR SPEC: 1.02 — SIGNIFICANT CHANGE UP (ref 1–1.03)
UROBILINOGEN FLD QL: 0.2 E.U./DL — SIGNIFICANT CHANGE UP
WBC # BLD: 13.6 K/UL — HIGH (ref 3.8–10.5)
WBC # BLD: 8.6 K/UL — SIGNIFICANT CHANGE UP (ref 3.8–10.5)
WBC # BLD: 9.6 K/UL — SIGNIFICANT CHANGE UP (ref 3.8–10.5)
WBC # FLD AUTO: 13.6 K/UL — HIGH (ref 3.8–10.5)
WBC # FLD AUTO: 8.6 K/UL — SIGNIFICANT CHANGE UP (ref 3.8–10.5)
WBC # FLD AUTO: 9.6 K/UL — SIGNIFICANT CHANGE UP (ref 3.8–10.5)

## 2017-03-30 PROCEDURE — 86850 RBC ANTIBODY SCREEN: CPT

## 2017-03-30 PROCEDURE — 86901 BLOOD TYPING SEROLOGIC RH(D): CPT

## 2017-03-30 PROCEDURE — 82330 ASSAY OF CALCIUM: CPT

## 2017-03-30 PROCEDURE — 93005 ELECTROCARDIOGRAM TRACING: CPT

## 2017-03-30 PROCEDURE — 86803 HEPATITIS C AB TEST: CPT

## 2017-03-30 PROCEDURE — 84132 ASSAY OF SERUM POTASSIUM: CPT

## 2017-03-30 PROCEDURE — 81001 URINALYSIS AUTO W/SCOPE: CPT

## 2017-03-30 PROCEDURE — 85610 PROTHROMBIN TIME: CPT

## 2017-03-30 PROCEDURE — 83605 ASSAY OF LACTIC ACID: CPT

## 2017-03-30 PROCEDURE — 85027 COMPLETE CBC AUTOMATED: CPT

## 2017-03-30 PROCEDURE — 85379 FIBRIN DEGRADATION QUANT: CPT

## 2017-03-30 PROCEDURE — 99291 CRITICAL CARE FIRST HOUR: CPT | Mod: 25

## 2017-03-30 PROCEDURE — 81003 URINALYSIS AUTO W/O SCOPE: CPT

## 2017-03-30 PROCEDURE — 87521 HEPATITIS C PROBE&RVRS TRNSC: CPT

## 2017-03-30 PROCEDURE — 87486 CHLMYD PNEUM DNA AMP PROBE: CPT

## 2017-03-30 PROCEDURE — 82553 CREATINE MB FRACTION: CPT

## 2017-03-30 PROCEDURE — 87633 RESP VIRUS 12-25 TARGETS: CPT

## 2017-03-30 PROCEDURE — 71010: CPT | Mod: 26

## 2017-03-30 PROCEDURE — 83735 ASSAY OF MAGNESIUM: CPT

## 2017-03-30 PROCEDURE — 36415 COLL VENOUS BLD VENIPUNCTURE: CPT

## 2017-03-30 PROCEDURE — 96374 THER/PROPH/DIAG INJ IV PUSH: CPT

## 2017-03-30 PROCEDURE — 82803 BLOOD GASES ANY COMBINATION: CPT

## 2017-03-30 PROCEDURE — 87798 DETECT AGENT NOS DNA AMP: CPT

## 2017-03-30 PROCEDURE — 84484 ASSAY OF TROPONIN QUANT: CPT

## 2017-03-30 PROCEDURE — 93010 ELECTROCARDIOGRAM REPORT: CPT

## 2017-03-30 PROCEDURE — 87040 BLOOD CULTURE FOR BACTERIA: CPT

## 2017-03-30 PROCEDURE — 87086 URINE CULTURE/COLONY COUNT: CPT

## 2017-03-30 PROCEDURE — 94660 CPAP INITIATION&MGMT: CPT

## 2017-03-30 PROCEDURE — 80307 DRUG TEST PRSMV CHEM ANLYZR: CPT

## 2017-03-30 PROCEDURE — 87581 M.PNEUMON DNA AMP PROBE: CPT

## 2017-03-30 PROCEDURE — 96375 TX/PRO/DX INJ NEW DRUG ADDON: CPT

## 2017-03-30 PROCEDURE — 94640 AIRWAY INHALATION TREATMENT: CPT

## 2017-03-30 PROCEDURE — 84295 ASSAY OF SERUM SODIUM: CPT

## 2017-03-30 PROCEDURE — 71045 X-RAY EXAM CHEST 1 VIEW: CPT

## 2017-03-30 PROCEDURE — 86900 BLOOD TYPING SEROLOGIC ABO: CPT

## 2017-03-30 PROCEDURE — 82550 ASSAY OF CK (CPK): CPT

## 2017-03-30 PROCEDURE — 80053 COMPREHEN METABOLIC PANEL: CPT

## 2017-03-30 RX ORDER — HEPARIN SODIUM 5000 [USP'U]/ML
5000 INJECTION INTRAVENOUS; SUBCUTANEOUS EVERY 12 HOURS
Qty: 0 | Refills: 0 | Status: DISCONTINUED | OUTPATIENT
Start: 2017-03-30 | End: 2017-03-31

## 2017-03-30 RX ORDER — DILTIAZEM HCL 120 MG
120 CAPSULE, EXT RELEASE 24 HR ORAL ONCE
Qty: 0 | Refills: 0 | Status: COMPLETED | OUTPATIENT
Start: 2017-03-30 | End: 2017-03-30

## 2017-03-30 RX ORDER — INFLUENZA VIRUS VACCINE 15; 15; 15; 15 UG/.5ML; UG/.5ML; UG/.5ML; UG/.5ML
0.5 SUSPENSION INTRAMUSCULAR ONCE
Qty: 0 | Refills: 0 | Status: DISCONTINUED | OUTPATIENT
Start: 2017-03-30 | End: 2017-03-30

## 2017-03-30 RX ORDER — FERROUS SULFATE 325(65) MG
325 TABLET ORAL DAILY
Qty: 0 | Refills: 0 | Status: DISCONTINUED | OUTPATIENT
Start: 2017-03-30 | End: 2017-03-31

## 2017-03-30 RX ORDER — IPRATROPIUM/ALBUTEROL SULFATE 18-103MCG
3 AEROSOL WITH ADAPTER (GRAM) INHALATION ONCE
Qty: 0 | Refills: 0 | Status: COMPLETED | OUTPATIENT
Start: 2017-03-30 | End: 2017-03-30

## 2017-03-30 RX ORDER — SODIUM CHLORIDE 9 MG/ML
1000 INJECTION INTRAMUSCULAR; INTRAVENOUS; SUBCUTANEOUS
Qty: 0 | Refills: 0 | Status: DISCONTINUED | OUTPATIENT
Start: 2017-03-30 | End: 2017-03-31

## 2017-03-30 RX ORDER — DOCUSATE SODIUM 100 MG
100 CAPSULE ORAL
Qty: 0 | Refills: 0 | Status: DISCONTINUED | OUTPATIENT
Start: 2017-03-30 | End: 2017-03-31

## 2017-03-30 RX ORDER — METOPROLOL TARTRATE 50 MG
12.5 TABLET ORAL EVERY 12 HOURS
Qty: 0 | Refills: 0 | Status: DISCONTINUED | OUTPATIENT
Start: 2017-03-30 | End: 2017-03-30

## 2017-03-30 RX ORDER — ACETAMINOPHEN 500 MG
975 TABLET ORAL ONCE
Qty: 0 | Refills: 0 | Status: COMPLETED | OUTPATIENT
Start: 2017-03-30 | End: 2017-03-30

## 2017-03-30 RX ORDER — DILTIAZEM HCL 120 MG
240 CAPSULE, EXT RELEASE 24 HR ORAL DAILY
Qty: 0 | Refills: 0 | Status: DISCONTINUED | OUTPATIENT
Start: 2017-03-30 | End: 2017-03-30

## 2017-03-30 RX ORDER — GABAPENTIN 400 MG/1
300 CAPSULE ORAL THREE TIMES A DAY
Qty: 0 | Refills: 0 | Status: DISCONTINUED | OUTPATIENT
Start: 2017-03-30 | End: 2017-03-31

## 2017-03-30 RX ORDER — ACETAMINOPHEN 500 MG
650 TABLET ORAL EVERY 6 HOURS
Qty: 0 | Refills: 0 | Status: DISCONTINUED | OUTPATIENT
Start: 2017-03-30 | End: 2017-03-30

## 2017-03-30 RX ORDER — IPRATROPIUM BROMIDE 0.2 MG/ML
500 SOLUTION, NON-ORAL INHALATION EVERY 6 HOURS
Qty: 0 | Refills: 0 | Status: COMPLETED | OUTPATIENT
Start: 2017-03-30 | End: 2017-03-30

## 2017-03-30 RX ORDER — IBUPROFEN 200 MG
600 TABLET ORAL EVERY 8 HOURS
Qty: 0 | Refills: 0 | Status: DISCONTINUED | OUTPATIENT
Start: 2017-03-30 | End: 2017-03-30

## 2017-03-30 RX ORDER — ACETAMINOPHEN 500 MG
975 TABLET ORAL EVERY 8 HOURS
Qty: 0 | Refills: 0 | Status: DISCONTINUED | OUTPATIENT
Start: 2017-03-30 | End: 2017-03-31

## 2017-03-30 RX ORDER — ASPIRIN/CALCIUM CARB/MAGNESIUM 324 MG
81 TABLET ORAL DAILY
Qty: 0 | Refills: 0 | Status: DISCONTINUED | OUTPATIENT
Start: 2017-03-30 | End: 2017-03-31

## 2017-03-30 RX ORDER — IBUPROFEN 200 MG
600 TABLET ORAL EVERY 8 HOURS
Qty: 0 | Refills: 0 | Status: DISCONTINUED | OUTPATIENT
Start: 2017-03-30 | End: 2017-03-31

## 2017-03-30 RX ORDER — METOPROLOL TARTRATE 50 MG
50 TABLET ORAL DAILY
Qty: 0 | Refills: 0 | Status: DISCONTINUED | OUTPATIENT
Start: 2017-03-30 | End: 2017-03-31

## 2017-03-30 RX ORDER — GABAPENTIN 400 MG/1
100 CAPSULE ORAL THREE TIMES A DAY
Qty: 0 | Refills: 0 | Status: DISCONTINUED | OUTPATIENT
Start: 2017-03-30 | End: 2017-03-30

## 2017-03-30 RX ORDER — TRAMADOL HYDROCHLORIDE 50 MG/1
50 TABLET ORAL EVERY 6 HOURS
Qty: 0 | Refills: 0 | Status: DISCONTINUED | OUTPATIENT
Start: 2017-03-30 | End: 2017-03-31

## 2017-03-30 RX ORDER — FLUTICASONE PROPIONATE AND SALMETEROL 50; 250 UG/1; UG/1
1 POWDER ORAL; RESPIRATORY (INHALATION) EVERY 12 HOURS
Qty: 0 | Refills: 0 | Status: DISCONTINUED | OUTPATIENT
Start: 2017-03-30 | End: 2017-03-31

## 2017-03-30 RX ORDER — SODIUM CHLORIDE 9 MG/ML
1500 INJECTION INTRAMUSCULAR; INTRAVENOUS; SUBCUTANEOUS ONCE
Qty: 0 | Refills: 0 | Status: COMPLETED | OUTPATIENT
Start: 2017-03-30 | End: 2017-03-30

## 2017-03-30 RX ADMIN — Medication 81 MILLIGRAM(S): at 12:32

## 2017-03-30 RX ADMIN — Medication 1 TABLET(S): at 09:32

## 2017-03-30 RX ADMIN — TRAMADOL HYDROCHLORIDE 50 MILLIGRAM(S): 50 TABLET ORAL at 17:58

## 2017-03-30 RX ADMIN — TRAMADOL HYDROCHLORIDE 50 MILLIGRAM(S): 50 TABLET ORAL at 23:15

## 2017-03-30 RX ADMIN — Medication 975 MILLIGRAM(S): at 22:49

## 2017-03-30 RX ADMIN — TRAMADOL HYDROCHLORIDE 50 MILLIGRAM(S): 50 TABLET ORAL at 16:45

## 2017-03-30 RX ADMIN — Medication 1 TABLET(S): at 12:06

## 2017-03-30 RX ADMIN — Medication 975 MILLIGRAM(S): at 21:49

## 2017-03-30 RX ADMIN — Medication 600 MILLIGRAM(S): at 16:45

## 2017-03-30 RX ADMIN — Medication 125 MILLIGRAM(S): at 03:35

## 2017-03-30 RX ADMIN — TRAMADOL HYDROCHLORIDE 50 MILLIGRAM(S): 50 TABLET ORAL at 12:06

## 2017-03-30 RX ADMIN — Medication 120 MILLIGRAM(S): at 12:38

## 2017-03-30 RX ADMIN — Medication 500 MICROGRAM(S): at 19:13

## 2017-03-30 RX ADMIN — SODIUM CHLORIDE 1500 MILLILITER(S): 9 INJECTION INTRAMUSCULAR; INTRAVENOUS; SUBCUTANEOUS at 03:17

## 2017-03-30 RX ADMIN — Medication 240 MILLIGRAM(S): at 10:19

## 2017-03-30 RX ADMIN — Medication 500 MICROGRAM(S): at 10:19

## 2017-03-30 RX ADMIN — Medication 325 MILLIGRAM(S): at 12:32

## 2017-03-30 RX ADMIN — GABAPENTIN 100 MILLIGRAM(S): 400 CAPSULE ORAL at 12:32

## 2017-03-30 RX ADMIN — Medication 600 MILLIGRAM(S): at 17:58

## 2017-03-30 RX ADMIN — GABAPENTIN 300 MILLIGRAM(S): 400 CAPSULE ORAL at 21:48

## 2017-03-30 RX ADMIN — Medication 50 MILLIGRAM(S): at 12:33

## 2017-03-30 RX ADMIN — Medication 1 TABLET(S): at 13:33

## 2017-03-30 RX ADMIN — TRAMADOL HYDROCHLORIDE 50 MILLIGRAM(S): 50 TABLET ORAL at 09:27

## 2017-03-30 RX ADMIN — Medication 975 MILLIGRAM(S): at 03:45

## 2017-03-30 RX ADMIN — Medication 1 TABLET(S): at 14:22

## 2017-03-30 NOTE — H&P ADULT - PROBLEM SELECTOR PLAN 3
History of atrial fibrillation- EKG with sinus tachycardia on admission  Continue metoprolol and cardizem  Continue ASA 81mg daily

## 2017-03-30 NOTE — ED PROVIDER NOTE - CRITICAL CARE PROVIDED
additional history taking/documentation/interpretation of diagnostic studies/direct patient care (not related to procedure)

## 2017-03-30 NOTE — H&P ADULT - PMH
Arthritis    Atrial fibrillation    CAD (coronary artery disease)    COPD (chronic obstructive pulmonary disease)    Osteopenia    Sciatica

## 2017-03-30 NOTE — PROGRESS NOTE ADULT - ASSESSMENT
DISPOSITION:   Care Plan - Instructions:  Principal Discharge DX:	Fever, unspecified fever cause  Secondary Diagnosis:	COPD exacerbationPrincipal Discharge DX:	Fever, unspecified fever cause  Secondary Diagnosis:	COPD exacerbation  possible viral infection  long history of drug seeking behavior    Plan:    pain management  ID consult-will arrange  antibiotics per ID  pain meds as per pain management  supportive care    LINA Ng MD           Impression:  Principal Discharge Dx Fever, unspecified fever cause.     Secondary Discharge Dx COPD exacerbation.
Patient is a 70 yo F with COPD, atrial fibrillation, and chronic neck and back pain who presented initial with worsening pain and new fever. Initially thought to be 2/2 COPD exacerbation, but patient without cough, SOB, saturating well on RA. With initial fever and leukocytosis, but now with resolved leukocytosis. Patient does not appear to have infection at this time.

## 2017-03-30 NOTE — ED PROVIDER NOTE - MEDICAL DECISION MAKING DETAILS
fever, SOB, wheezing on exam, tachycardic  -check labs, ekg, cxr, ivf, tylenol, levaquin, solumedrol, nebs, bipap

## 2017-03-30 NOTE — ED ADULT NURSE NOTE - SEPSIS SCREEN SIGNS AND SYMPTOMS, MLM
temp greater than 100.9 degrees F/38.3 degrees C/heart rate greater than 90 beats/min/respiratory rate greater than 20 breaths/min

## 2017-03-30 NOTE — PROGRESS NOTE ADULT - PROBLEM SELECTOR PLAN 3
Patient with expiratory wheezing on admission with tachypnea; improved s/p nebs, solumedrol and bipap. Currently respiratory status at baseline and patient symptoms improved on RA. Symptoms likely 2/2 agitation and worsening pain. RVP negative. CXR clear.   - Discontinue prednisone.   - No need for abx at this time.   - Continue home symbicort and rescue nebs.

## 2017-03-30 NOTE — DIETITIAN INITIAL EVALUATION ADULT. - OTHER INFO
72 y/o female admitted with SOB and noteable weight loss over 1 year.Patient has many food intolerances (real or preferred?) She claims to have history of intolerance to meat.Requesting ensure clears only.Declines ensure enlive.As per patient is in wheelchair most of the time and has difficulty gaining weight

## 2017-03-30 NOTE — ED PROVIDER NOTE - PROGRESS NOTE DETAILS
breathing improved on bipap spoke with Dr. Ng  - will admit for continued ABX, f/u blood cultures, bipap prn

## 2017-03-30 NOTE — H&P ADULT - NSHPLABSRESULTS_GEN_ALL_CORE
.  LABS:                         9.1    13.6  )-----------( 336      ( 30 Mar 2017 03:33 )             28.6     30 Mar 2017 03:33    141    |  111    |  35     ----------------------------<  122    4.4     |  23     |  1.11     Ca    8.8        30 Mar 2017 03:33    TPro  8.7    /  Alb  3.1    /  TBili  0.2    /  DBili  x      /  AST  55     /  ALT  47     /  AlkPhos  243    30 Mar 2017 03:33    PT/INR - ( 30 Mar 2017 03:33 )   PT: 11.8 sec;   INR: 1.06            Urinalysis Basic - ( 30 Mar 2017 04:06 )    Color: Yellow / Appearance: Clear / S.020 / pH: x  Gluc: x / Ketone: NEGATIVE  / Bili: NEGATIVE / Urobili: 0.2 E.U./dL   Blood: x / Protein: 30 mg/dL / Nitrite: NEGATIVE   Leuk Esterase: NEGATIVE / RBC: < 5 /HPF / WBC < 5 /HPF   Sq Epi: x / Non Sq Epi: Rare /HPF / Bacteria: Present /HPF      CARDIAC MARKERS ( 30 Mar 2017 03:33 )  <0.015 ng/mL / x     / 31 U/L / x     / <1.0 ng/mL        Lactate, Blood: 1.2 mmoL/L ( @ 03:33)      RADIOLOGY, EKG & ADDITIONAL TESTS: Reviewed.

## 2017-03-30 NOTE — H&P ADULT - NSHPPHYSICALEXAM_GEN_ALL_CORE
.  VITAL SIGNS:  T(C): 38.6, Max: 38.6 (03-30 @ 04:41)  T(F): 101.4, Max: 101.4 (03-30 @ 04:41)  HR: 103 (103 - 123)  BP: 163/75 (149/91 - 180/80)  BP(mean): 110 (110 - 115)  RR: 23 (17 - 32)  SpO2: 100% (96% - 100%)  Wt(kg): --    PHYSICAL EXAM:    Constitutional: resting comfortably in bed; NAD, cachectic/frail appearing  Head: NC/AT  Eyes: PERRL, EOMI, clear conjunctiva  ENT:  no oropharyngeal erythema or exudates; MMM  Neck: supple; no JVD   Respiratory: +bipap, not tachypneic, speaking full sentences, no accessory muscle use, mild expiratory wheezing all field, no rhonchi/rales  Cardiac: +S1/S2; RRR; no M/R/G  Gastrointestinal: soft, NT/ND; no rebound or guarding; +BSx4  Back: no CVAT B/L  Extremities:no clubbing or cyanosis; no peripheral edema  Vascular: 2+ radial, DP/PT pulses B/L  Dermatologic: skin warm, dry and intact; no rashes, wounds, or scars  Lymphatic: no submandibular or cervical LAD  Neurologic: AAOx3; CNII-XII grossly intact; no focal deficits, moves all extremities, sensation intact  Psychiatric: affect and characteristics of appearance, verbalizations, behaviors are appropriate

## 2017-03-30 NOTE — DIETITIAN INITIAL EVALUATION ADULT. - NS AS NUTRI INTERV MEALS SNACK
Energy - modified diet/General/healthful diet/Schedule of food/fluids/requesting ensure clears TID(600kcal/21gmprotein)

## 2017-03-30 NOTE — H&P ADULT - PROBLEM SELECTOR PLAN 1
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

## 2017-03-30 NOTE — PROGRESS NOTE ADULT - PROBLEM SELECTOR PLAN 2
Patient with history of back pain, CT done in early march showing compression deformities in T8, L2 and L5. Also with persistent headaches. States that opioids help best. Also states that she is on fioricet, tramadol, and motrin at home.   - Gabapentin 100mg TID - home dose.   - Fioricet 1 tab q4h PRN headache.   - Pain management consult.

## 2017-03-30 NOTE — H&P ADULT - HISTORY OF PRESENT ILLNESS
Patient is a 71 year old female, PMH COPD (not on home O2), atrial fibrillation, familial mediterranean fever, CAD, anemia, osteopenia, chronic back pain s/p recent thoracic/lumbar compression fractures, sciatica, wheelchair bound secondary to LE injury, presenting with acute shortness of breath and severe back pain.  Back pain is chronic and patient states that it is usually a 5-7/10 on the pain scale. Today she had severe pain in the same location, 10/10.  She says that she gets short of breath when in severe pain. No fever at home, chills, cough, increased sputum production or wheezing.   In the ED found to be febrile to 101, tachycardic to 123, RR 30, SBP 140s-180.  Patient received solumedrol, nebs, and was put on bipap.  As per EMS, O2 saturation 91% en route to the hospital.   Currently, patient states she feels much better and doesn't think she needs the bipap anymore.  She feels as though she is at her baseline respiratory status. She is primarily concerned about her pain and pain medications. No falls or trauma since last admission.

## 2017-03-30 NOTE — ED PROVIDER NOTE - OBJECTIVE STATEMENT
71F hx COPD, CAD, BIBEMS for SOB.  pt states worsening SOb tonight.  no cough. +fever today.  no chest pain. vomiting once.  no HA. no rash. no diarrhea.  no sick contacts.

## 2017-03-30 NOTE — PROGRESS NOTE ADULT - PROBLEM SELECTOR PLAN 4
History of atrial fibrillation - EKG with sinus tachycardia on admission.  - Continue metoprolol XL 50mg daily and cardizem ER 360mg daily. Home meds.  - Continue ASA 81mg daily

## 2017-03-30 NOTE — ED ADULT NURSE NOTE - OBJECTIVE STATEMENT
Pt BIBA c/o fever and worsening SOB tonight.  Per pt's S.O, pt with a temp at home of 100.  Denies taking Tylenol/Motrin PTA.  Pt denies cough, abd pain, N/V, diaphoresis, or chest pain.  Pt denies HA, rash, or LE edema.  Pt denies sick contacts or travel out of the country in the last 3 weeks.   Upon arrival to the ER, pt is AAO X 4, fully verbal and mobile speaking in 4-5 word sentences.  Torso and extremities are warm and dry, skin color natural, and turgor normal.  Mucus membranes moist and pink.  HEENT intact. +PERRLA.  Trachea midline. No JVD. Resps tachypneic with + use of accessory muscles noted.  RT called for BiPap set up. No crepitus or signs of trauma.  Abdomen soft and flat, non distended, and non tender to palpation with normal active BS noted X 4.  Pt S.O at the bedside, behavior is appropriate and is attentive to pt.  Call bell in reach and patient's bed locked and placed in lowest position. Patient provided for emotional support, comfort, frequent rounding, and review of plan of care. Both verbalized understanding and deny additional needs at this time.  Will continue to monitor.

## 2017-03-30 NOTE — H&P ADULT - ASSESSMENT
Patient is a 71 year old female, PMH COPD ,atrial fibrillation, chronic pain, presenting with sepsis secondary to unclear etiology at this time, possible viral illness, also likely COPD exacerbation.

## 2017-03-30 NOTE — CONSULT NOTE ADULT - SUBJECTIVE AND OBJECTIVE BOX
[facility] Consult [facility] - [facility] [phone]    Chief Complaint:  Back pain    HPI:  Patient is a 71 year old female, PMH COPD (not on home O2), atrial fibrillation, familial mediterranean fever, CAD, anemia, osteopenia, chronic back pain s/p recent thoracic/lumbar compression fractures, sciatica, wheelchair bound secondary to LE injury, presenting with acute shortness of breath and severe back pain.  Back pain is chronic and patient states that it is usually a 5-7/10 on the pain scale.  Pt. states the pain is in her neck and goes down the spine to the buttocks.  States she also pain in the buttocks on both sides and radiates down both legs.  Pt. states she used to see Dr. Stewart for pain management, but hasn't seen him since May of 2016.  Also reports she was recently in [facility] for the last week and was discharged last Friday and at home has been taking tramadol 50 mg po q6h prn, gabapentin 200 mg tid, and ibuprofen.  Pt. states she has been on opiates for pain for many years.        Pain is _x__ sharp ____dull ___burning ___achy ___ Intensity: ____ mild ___mod ___severe     Location ____surgical site ____cervical __x___lumbar ____abd ____upper ext____lower ext    Worse with __x__activity _x___movement _____physical therapy___ Rest    Improved with __x__medication __x__rest ____physical therapy    ROS: Const:  _+__febrile   Eyes:___ENT:___CV: _-__chest pain  Resp: __+__sob  GI:_-_nausea _-__vomiting _-__abd pain ___npo ___clears _+_full diet __bm  :__-_ Musk: _+__pain ___spasm  Skin:_-__ Neuro:  __-_rhumfmyb_-__krvudyfgj_-__ numbness _-__weakness ___paresth  Psych:__anxiety  Endo:_-__ Heme:___Allergy:___PCN, apple______, ___all others reviewed and negative    PAST MEDICAL & SURGICAL HISTORY:  Atrial fibrillation  Osteopenia  Sciatica  CAD (coronary artery disease)  Arthritis  COPD (chronic obstructive pulmonary disease)  H/O appendicitis      SH: _denies__Tobacco   _denies__Alcohol                          FH:FAMILY HISTORY:  mother-cancer      ipratropium    for Nebulization 500MICROGram(s) Inhalation every 6 hours  traMADol 50milliGRAM(s) Oral every 6 hours PRN  aspirin enteric coated 81milliGRAM(s) Oral daily  ferrous    sulfate 325milliGRAM(s) Oral daily  docusate sodium 100milliGRAM(s) Oral two times a day  heparin  Injectable 5000Unit(s) SubCutaneous every 12 hours  sodium chloride 0.9%. 1000milliLiter(s) IV Continuous <Continuous>  acetaminophen/butalbital/caffeine 1Tablet(s) Oral every 4 hours PRN  metoprolol succinate ER 50milliGRAM(s) Oral daily  fluticasone / salmeterol 100-50 MICROgram(s) Diskus 1Dose(s) Inhalation every 12 hours  gabapentin 100milliGRAM(s) Oral three times a day      T(C): 36.7, Max: 38.6 (03-30 @ 04:41)  HR: 84 (84 - 123)  BP: 145/66 (144/69 - 180/80)  RR: 18 (16 - 32)  SpO2: 95% (95% - 100%)  Wt(kg): --    T(C): 36.7, Max: 38.6 (03-30 @ 04:41)  HR: 84 (84 - 123)  BP: 145/66 (144/69 - 180/80)  RR: 18 (16 - 32)  SpO2: 95% (95% - 100%)  Wt(kg): --    T(C): 36.7, Max: 38.6 (03-30 @ 04:41)  HR: 84 (84 - 123)  BP: 145/66 (144/69 - 180/80)  RR: 18 (16 - 32)  SpO2: 95% (95% - 100%)  Wt(kg): --    PHYSICAL EXAM:  Gen Appearance: _x__no acute distress __x_appropriate        Neuro: _x__SILT feet__x__ EOM Intact Psych: AAOX_3_, _x__mood/affect appropriate        Eyes: _x__conjunctiva WNL  __x___ Pupils equal and round        ENT: _x__ears and nose atraumatic_x__ Hearing grossly intact        Neck: ___trachea midline, no visible masses ___thyroid without palpable mass    Resp: _x__Nml WOB____No tactile fremitus ___clear to auscultation    Cardio: _x__extremities free from edema __x__pedal pulses palpable    GI/Abdomen: ___soft _____ Nontender______Nondistended_____HSM    Lymphatic: ___no palpable nodes in neck  ___no palpable nodes calves and feet    Skin/Wound: ___Incision, ___Dressing c/d/i,   ____surrounding tissues soft,  ___drain/chest tube present____    Muscular: EHL __5_/5  Gastrocnemius___/5    _x__absent clubbing/cyanosis      ASSESSMENT: This is a 71y old Female with a history of   FEVER: FEVER  Atrial fibrillation  Osteopenia  Sciatica  CAD (coronary artery disease)  Arthritis  COPD (chronic obstructive pulmonary disease)  Fever, unspecified fever cause  Back pain: Back pain  Atrial fibrillation: Atrial fibrillation  H/O appendicitis  SOB: SOB  COPD exacerbation  and chronic back pain that patient reports is not well controlled.    Recommended Treatment PLAN: [facility] [phone]    Chief Complaint:  Back pain    HPI:  Patient is a 71 year old female, PMH COPD (not on home O2), atrial fibrillation, familial mediterranean fever, CAD, anemia, osteopenia, chronic back pain s/p recent thoracic/lumbar compression fractures, sciatica, wheelchair bound secondary to LE injury, presenting with acute shortness of breath and severe back pain.  Back pain is chronic and patient states that it is usually a 5-7/10 on the pain scale.  Pt. states the pain is in her neck and goes down the spine to the buttocks.  States she also pain in the buttocks on both sides and radiates down both legs.  Pt. states she used to see Dr. Stewart for pain management, but hasn't seen him since May of 2016.  Also reports she was recently in [facility] for the last week and was discharged last Friday and at home has been taking tramadol 50 mg po q6h prn, gabapentin 200 mg tid, and ibuprofen.  Pt. states she has been on opiates for pain for many years.        Pain is _x__ sharp ____dull ___burning ___achy ___ Intensity: ____ mild ___mod ___severe     Location ____surgical site ____cervical __x___lumbar ____abd ____upper ext____lower ext    Worse with __x__activity _x___movement _____physical therapy___ Rest    Improved with __x__medication __x__rest ____physical therapy    ROS: Const:  _+__febrile   Eyes:___ENT:___CV: _-__chest pain  Resp: __+__sob  GI:_-_nausea _-__vomiting _-__abd pain ___npo ___clears _+_full diet __bm  :__-_ Musk: _+__pain ___spasm  Skin:_-__ Neuro:  __-_aidfatth_-__fzvtxperi_-__ numbness _-__weakness ___paresth  Psych:__anxiety  Endo:_-__ Heme:___Allergy:___PCN, apple______, ___all others reviewed and negative    PAST MEDICAL & SURGICAL HISTORY:  Atrial fibrillation  Osteopenia  Sciatica  CAD (coronary artery disease)  Arthritis  COPD (chronic obstructive pulmonary disease)  H/O appendicitis      SH: _denies__Tobacco   _denies__Alcohol                          FH:FAMILY HISTORY:  mother-cancer      ipratropium    for Nebulization 500MICROGram(s) Inhalation every 6 hours  traMADol 50milliGRAM(s) Oral every 6 hours PRN  aspirin enteric coated 81milliGRAM(s) Oral daily  ferrous    sulfate 325milliGRAM(s) Oral daily  docusate sodium 100milliGRAM(s) Oral two times a day  heparin  Injectable 5000Unit(s) SubCutaneous every 12 hours  sodium chloride 0.9%. 1000milliLiter(s) IV Continuous <Continuous>  acetaminophen/butalbital/caffeine 1Tablet(s) Oral every 4 hours PRN  metoprolol succinate ER 50milliGRAM(s) Oral daily  fluticasone / salmeterol 100-50 MICROgram(s) Diskus 1Dose(s) Inhalation every 12 hours  gabapentin 100milliGRAM(s) Oral three times a day      T(C): 36.7, Max: 38.6 (03-30 @ 04:41)  HR: 84 (84 - 123)  BP: 145/66 (144/69 - 180/80)  RR: 18 (16 - 32)  SpO2: 95% (95% - 100%)  Wt(kg): --    T(C): 36.7, Max: 38.6 (03-30 @ 04:41)  HR: 84 (84 - 123)  BP: 145/66 (144/69 - 180/80)  RR: 18 (16 - 32)  SpO2: 95% (95% - 100%)  Wt(kg): --    T(C): 36.7, Max: 38.6 (03-30 @ 04:41)  HR: 84 (84 - 123)  BP: 145/66 (144/69 - 180/80)  RR: 18 (16 - 32)  SpO2: 95% (95% - 100%)  Wt(kg): --    PHYSICAL EXAM:  Gen Appearance: _x__no acute distress __x_appropriate        Neuro: _x__SILT feet__x__ EOM Intact Psych: AAOX_3_, _x__mood/affect appropriate        Eyes: _x__conjunctiva WNL  __x___ Pupils equal and round        ENT: _x__ears and nose atraumatic_x__ Hearing grossly intact        Neck: ___trachea midline, no visible masses ___thyroid without palpable mass    Resp: _x__Nml WOB____No tactile fremitus ___clear to auscultation    Cardio: _x__extremities free from edema __x__pedal pulses palpable    GI/Abdomen: ___soft _____ Nontender______Nondistended_____HSM    Lymphatic: ___no palpable nodes in neck  ___no palpable nodes calves and feet    Skin/Wound: ___Incision, ___Dressing c/d/i,   ____surrounding tissues soft,  ___drain/chest tube present____    Muscular: EHL __5_/5  Gastrocnemius___/5    _x__absent clubbing/cyanosis      ASSESSMENT: This is a 71y old Female with a history of   FEVER: FEVER  Atrial fibrillation  Osteopenia  Sciatica  CAD (coronary artery disease)  Arthritis  COPD (chronic obstructive pulmonary disease)  Fever, unspecified fever cause  Back pain: Back pain  Atrial fibrillation: Atrial fibrillation  H/O appendicitis  SOB: SOB  COPD exacerbation  and chronic back pain that patient reports is not well controlled.    Recommended Treatment PLAN:  1.  Suggest continue tramadol 50 mg po q4h prn  2.  Suggest tylenol 975 mg po q8h standing  3.  Suggest motrin 600 mg po q8h prn  4.  Consider increasing gabapentin to 300 mg TID
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
## 2017-03-30 NOTE — PROGRESS NOTE ADULT - PROBLEM SELECTOR PLAN 1
Patient febrile on admission, tachycardic, and tachypnic. Thought to have COPD exacerbation but now saturating well, comfortable on RA. No SOB, cough, wheezing. Has been afebrile since admission. Symptoms could have been 2/2 worsening pain. Patient now at baseline respiratory function, still in pain. Has history of pain and pain med seeking.   - Monitor for fevers and leukocytosis.

## 2017-03-30 NOTE — H&P ADULT - PROBLEM SELECTOR PLAN 2
Patient with expiratory wheezing on admission with tachypnea; improved s/p nebs, solumedrol and bipap.  Currently respiratory status at baseline and patient symptoms improved. Will switch bipap to nasal cannula and monitor respiratory status  -f/u RVP  -Continue Prednisone 40mg daily for 4 more days  CXR with no consolidation/infiltrate, s/p one dose of Levaquin; will hold off of further antibiotics at this time; will add back if any change in clinical status

## 2017-03-31 LAB
CULTURE RESULTS: SIGNIFICANT CHANGE UP
SPECIMEN SOURCE: SIGNIFICANT CHANGE UP

## 2017-03-31 NOTE — DISCHARGE NOTE ADULT - HOSPITAL COURSE
71F PMH COPD (not on home O2), atrial fibrillation, familial mediterranean fever, CAD, anemia, osteopenia, chronic back pain s/p recent thoracic/lumbar compression fractures, sciatica, wheelchair bound secondary to LE injury, presenting with acute shortness of breath and severe back pain.  Back pain is chronic and patient states that it is usually a 5-7/10 on the pain scale. Today she had severe pain in the same location, 10/10.  She says that she gets short of breath when in severe pain. No fever at home, chills, cough, increased sputum production or wheezing.   In the ED found to be febrile to 101, tachycardic to 123, RR 30, SBP 140s-180.  Patient received solumedrol, nebs, and was put on bipap.  As per EMS, O2 saturation 91% en route to the hospital.   Currently, patient states she feels much better and doesn't think she needs the bipap anymore.  She feels as though she is at her baseline respiratory status. She is primarily concerned about her pain and pain medications. No falls or trauma since last admission.  Although pt febrile w/ leukocytosis on admission, afebrile and resolved leukocytosis for remainder of admission and low likelihood of infectious source. Fever possibly 2/2 FMF. Pt c/o 71F PMH COPD (not on home O2), atrial fibrillation, familial mediterranean fever, CAD, anemia, osteopenia, chronic back pain s/p recent thoracic/lumbar compression fractures, sciatica, wheelchair bound secondary to LE injury, presenting with acute shortness of breath and severe back pain.  Back pain is chronic and patient states that it is usually a 5-7/10 on the pain scale. Today she had severe pain in the same location, 10/10.  She says that she gets short of breath when in severe pain. No fever at home, chills, cough, increased sputum production or wheezing.   In the ED found to be febrile to 101, tachycardic to 123, RR 30, SBP 140s-180.  Patient received solumedrol, nebs, and was put on bipap.  As per EMS, O2 saturation 91% en route to the hospital.   Currently, patient states she feels much better and doesn't think she needs the bipap anymore.  She feels as though she is at her baseline respiratory status. She is primarily concerned about her pain and pain medications. No falls or trauma since last admission.  Although pt febrile w/ leukocytosis on admission, afebrile and resolved leukocytosis for remainder of admission and low likelihood of infectious source. Fever possibly 2/2 FMF. Pt states that her pain meds are not working for her. Was seen by pain management w/ recs: tramadol 50mg q6h, tylenol 975mg q8h, motrin 600mg q6h, gabapentin 300mg TID. Pt now complaining of insurance issues, hospital politics and inability to obtain oxycodone. Pt expressed interest in signing out AMA and was made aware of risks and benefits. She is AAOx3 and has capacity to sign out. Pt signed out AMA.

## 2017-03-31 NOTE — DISCHARGE NOTE ADULT - PATIENT PORTAL LINK FT
“You can access the [facility] Patient Portal, offered by [facility], by registering with the following website: [email or web address]”

## 2017-04-05 DIAGNOSIS — I48.91 UNSPECIFIED ATRIAL FIBRILLATION: ICD-10-CM

## 2017-04-05 DIAGNOSIS — G89.29 OTHER CHRONIC PAIN: ICD-10-CM

## 2017-04-05 DIAGNOSIS — M04.1 PERIODIC FEVER SYNDROMES: ICD-10-CM

## 2017-04-05 DIAGNOSIS — D64.9 ANEMIA, UNSPECIFIED: ICD-10-CM

## 2017-04-05 DIAGNOSIS — M54.30 SCIATICA, UNSPECIFIED SIDE: ICD-10-CM

## 2017-04-05 DIAGNOSIS — M54.9 DORSALGIA, UNSPECIFIED: ICD-10-CM

## 2017-04-05 DIAGNOSIS — R50.9 FEVER, UNSPECIFIED: ICD-10-CM

## 2017-04-05 DIAGNOSIS — D72.829 ELEVATED WHITE BLOOD CELL COUNT, UNSPECIFIED: ICD-10-CM

## 2017-04-05 DIAGNOSIS — M85.80 OTHER SPECIFIED DISORDERS OF BONE DENSITY AND STRUCTURE, UNSPECIFIED SITE: ICD-10-CM

## 2017-04-05 DIAGNOSIS — J44.9 CHRONIC OBSTRUCTIVE PULMONARY DISEASE, UNSPECIFIED: ICD-10-CM

## 2017-04-05 DIAGNOSIS — I25.10 ATHEROSCLEROTIC HEART DISEASE OF NATIVE CORONARY ARTERY WITHOUT ANGINA PECTORIS: ICD-10-CM

## 2018-09-13 NOTE — CONSULT NOTE ADULT - CONSULT REQUESTED BY NAME
Berenice Airway patent, Nasal mucosa clear. Mouth with normal mucosa. Throat has no vesicles, no oropharyngeal exudates and uvula is midline. +tracheostomy

## 2019-06-09 NOTE — PROGRESS NOTE ADULT - PROBLEM SELECTOR PLAN 4
Patient reports history of heart attacks x 3. reports no cardiac catheterizations or stents.  - Cont ASA no chest pain and no edema. cont aspirin. no chest symptoms. ordered for official echo.
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
## 2022-08-04 NOTE — ED ADULT NURSE NOTE - PLAN OF CARE
Detail Level: Simple Bedside visitors/Side rails/Call bell/Fall precautions/I and O/Position of comfort/NPO/Explanation of exam/test

## 2023-04-23 NOTE — ED ADULT NURSE NOTE - LEARNING READINESS
Per chart review, her K was low in the past, so will order repeat chem panel.   
She reports anxiety as well as depression hx, as well as multiple suicide attempts in the past, for which she has previously seen psychiatry but does not currently have a psychiatrist, so will place urgent referral. She has a therapist, who she is seeing regularly. To relieve her mood symptoms, she states that writing helps. She is currently working on a book about COVID. Associated with uncontrolled anxiety, she often gets nauseous. Would expect nausea to improve once patient sees psychiatry and anxiety is appropriately managed. In the meantime, will prescribe hydroxyzine PRN for anxiety. She denies active SI/ HI. Counseled her about meditation/ anxiety management techniques.  
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  
Yes

## 2025-03-14 NOTE — H&P ADULT. - PROBLEM SELECTOR PLAN 3
Discharge Summary/Instructions after an Endoscopic Procedure  Patient Name: Royer Castillo  Patient MRN: [id number]  Patient YOB: 1960 Friday, March 14, 2025  Cl Torres MD  Dear patient,  As a result of recent federal legislation (The Federal Cures Act), you may   receive lab or pathology results from your procedure in your [facility]   account before your physician is able to contact you. Your physician or   their representative will relay the results to you with their   recommendations at their soonest availability.  Thank you,  RESTRICTIONS:  During your procedure today, you received medications for sedation.  These   medications may affect your judgment, balance and coordination.  Therefore,   for 24 hours, you have the following restrictions:   - DO NOT drive a car, operate machinery, make legal/financial decisions,   sign important papers or drink alcohol.    ACTIVITY:  Today: no heavy lifting, straining or running due to procedural   sedation/anesthesia.  The following day: return to full activity including work.  DIET:  Eat and drink normally unless instructed otherwise.     TREATMENT FOR COMMON SIDE EFFECTS:  - Mild abdominal pain, nausea, belching, bloating or excessive gas:  rest,   eat lightly and use a heating pad.  - Sore Throat: treat with throat lozenges and/or gargle with warm salt   water.  - Because air was used during the procedure, expelling large amounts of air   from your rectum or belching is normal.  - If a bowel prep was taken, you may not have a bowel movement for 1-3 days.    This is normal.  SYMPTOMS TO WATCH FOR AND REPORT TO YOUR PHYSICIAN:  1. Abdominal pain or bloating, other than gas cramps.  2. Chest pain.  3. Back pain.  4. Signs of infection such as: chills or fever occurring within 24 hours   after the procedure.  5. Rectal bleeding, which would show as bright red, maroon, or black stools.   (A tablespoon of blood from the rectum is not serious, especially if    hemorrhoids are present.)  6. Vomiting.  7. Weakness or dizziness.  GO DIRECTLY TO THE NEAREST EMERGENCY ROOM IF YOU HAVE ANY OF THE FOLLOWING:      Difficulty breathing              Chills and/or fever over 101 F   Persistent vomiting and/or vomiting blood   Severe abdominal pain   Severe chest pain   Black, tarry stools   Bleeding- more than one tablespoon   Any other symptom or condition that you feel may need urgent attention  Your doctor recommends these additional instructions:  If any biopsies were taken, your doctors clinic will contact you in 1 to 2   weeks with any results.  - Discharge patient to home.   - Resume previous diet.   - Continue present medications.   - Await pathology results.   - Repeat colonoscopy in 3 years for surveillance of multiple polyps.   - Patient has a contact number available for emergencies.  The signs and   symptoms of potential delayed complications were discussed with the   patient.  Return to normal activities tomorrow.  Written discharge   instructions were provided to the patient.  For questions, problems or results please call your physician - Cl Torres MD at Work:  [phone].  [facility] [place], EMERGENCY ROOM PHONE NUMBER: [phone]  IF A COMPLICATION OR EMERGENCY SITUATION ARISES AND YOU ARE UNABLE TO REACH   YOUR PHYSICIAN - GO DIRECTLY TO THE EMERGENCY ROOM.  MD Cl Madrigal MD  3/14/2025 1:59:31 PM  This report has been verified and signed electronically.  Dear patient,  As a result of recent federal legislation (The Federal Cures Act), you may   receive lab or pathology results from your procedure in your [facility]   account before your physician is able to contact you. Your physician or   their representative will relay the results to you with their   recommendations at their soonest availability.  Thank you,  [employer]   Patient reports increased urinary frequency. UA with few wbc, present bacteria.   -  Cipro 250 q12 for 3 days

## 2025-04-22 NOTE — ED ADULT TRIAGE NOTE - HEART RATE (BEATS/MIN)
CXR: Increased patchy density in the right lower lobe with probable pleural effusion. Pneumonia is not excluded. Stable right upper lobe consolidation with underlying bullous change compared with the recent CT, improved from the prior x-ray.   82